# Patient Record
Sex: FEMALE | Race: WHITE | NOT HISPANIC OR LATINO | Employment: OTHER | URBAN - METROPOLITAN AREA
[De-identification: names, ages, dates, MRNs, and addresses within clinical notes are randomized per-mention and may not be internally consistent; named-entity substitution may affect disease eponyms.]

---

## 2019-03-05 ENCOUNTER — TRANSFERRED RECORDS (OUTPATIENT)
Dept: HEALTH INFORMATION MANAGEMENT | Facility: CLINIC | Age: 74
End: 2019-03-05

## 2019-03-05 LAB
CREAT SERPL-MCNC: 0.97 MG/DL (ref 0.6–0.93)
GFR SERPL CREATININE-BSD FRML MDRD: 57 ML/MIN/1.73M2
GLUCOSE SERPL-MCNC: 99 MG/DL (ref 65–99)
POTASSIUM SERPL-SCNC: 4.3 MMOL/L (ref 3.5–5.3)

## 2019-05-03 RX ORDER — ZOLEDRONIC ACID 5 MG/100ML
5 INJECTION, SOLUTION INTRAVENOUS ONCE
Status: CANCELLED
Start: 2019-05-20

## 2019-05-03 RX ORDER — HEPARIN SODIUM (PORCINE) LOCK FLUSH IV SOLN 100 UNIT/ML 100 UNIT/ML
5 SOLUTION INTRAVENOUS
Status: CANCELLED | OUTPATIENT
Start: 2019-05-20

## 2019-05-03 RX ORDER — HEPARIN SODIUM,PORCINE 10 UNIT/ML
5 VIAL (ML) INTRAVENOUS
Status: CANCELLED | OUTPATIENT
Start: 2019-05-20

## 2019-05-20 ENCOUNTER — INFUSION THERAPY VISIT (OUTPATIENT)
Dept: INFUSION THERAPY | Facility: CLINIC | Age: 74
End: 2019-05-20
Attending: SPECIALIST
Payer: COMMERCIAL

## 2019-05-20 ENCOUNTER — HOSPITAL ENCOUNTER (OUTPATIENT)
Facility: CLINIC | Age: 74
Setting detail: SPECIMEN
Discharge: HOME | End: 2019-05-20
Attending: SPECIALIST | Admitting: SPECIALIST
Payer: COMMERCIAL

## 2019-05-20 VITALS
TEMPERATURE: 98.7 F | RESPIRATION RATE: 18 BRPM | DIASTOLIC BLOOD PRESSURE: 83 MMHG | OXYGEN SATURATION: 100 % | HEART RATE: 57 BPM | SYSTOLIC BLOOD PRESSURE: 143 MMHG

## 2019-05-20 DIAGNOSIS — M81.0 OSTEOPOROSIS: Primary | ICD-10-CM

## 2019-05-20 LAB
CALCIUM SERPL-MCNC: 9 MG/DL (ref 8.5–10.1)
CREAT SERPL-MCNC: 0.93 MG/DL (ref 0.52–1.04)
GFR SERPL CREATININE-BSD FRML MDRD: 60 ML/MIN/{1.73_M2}

## 2019-05-20 PROCEDURE — 82310 ASSAY OF CALCIUM: CPT | Performed by: SPECIALIST

## 2019-05-20 PROCEDURE — 96374 THER/PROPH/DIAG INJ IV PUSH: CPT

## 2019-05-20 PROCEDURE — 82565 ASSAY OF CREATININE: CPT | Performed by: SPECIALIST

## 2019-05-20 PROCEDURE — 25000128 H RX IP 250 OP 636: Performed by: INTERNAL MEDICINE

## 2019-05-20 RX ORDER — ZOLEDRONIC ACID 5 MG/100ML
5 INJECTION, SOLUTION INTRAVENOUS ONCE
Status: COMPLETED | OUTPATIENT
Start: 2019-05-20 | End: 2019-05-20

## 2019-05-20 RX ORDER — ATORVASTATIN CALCIUM 10 MG/1
10 TABLET, FILM COATED ORAL DAILY
COMMUNITY

## 2019-05-20 RX ORDER — ZOLEDRONIC ACID 5 MG/100ML
5 INJECTION, SOLUTION INTRAVENOUS ONCE
Status: CANCELLED
Start: 2019-05-20

## 2019-05-20 RX ORDER — HEPARIN SODIUM,PORCINE 10 UNIT/ML
5 VIAL (ML) INTRAVENOUS
Status: CANCELLED | OUTPATIENT
Start: 2019-05-20

## 2019-05-20 RX ORDER — MULTIVITAMIN WITH IRON
1 TABLET ORAL DAILY
COMMUNITY

## 2019-05-20 RX ORDER — HEPARIN SODIUM (PORCINE) LOCK FLUSH IV SOLN 100 UNIT/ML 100 UNIT/ML
5 SOLUTION INTRAVENOUS
Status: CANCELLED | OUTPATIENT
Start: 2019-05-20

## 2019-05-20 RX ORDER — TAMOXIFEN CITRATE 10 MG/1
10 TABLET ORAL DAILY
COMMUNITY
End: 2023-11-30

## 2019-05-20 RX ADMIN — ZOLEDRONIC ACID 5 MG: 0.05 INJECTION, SOLUTION INTRAVENOUS at 15:25

## 2019-05-20 ASSESSMENT — PAIN SCALES - GENERAL: PAINLEVEL: NO PAIN (0)

## 2019-05-20 NOTE — PROGRESS NOTES
Infusion Nursing Note:  Immanuel Thomas presents today for Reclast.    Patient seen by provider today: No    Note: First time getting Reclast today. Oriented to infusion center. Reclast teaching, side effects, and schedule reviewed with patient.  Literature on Reclast given to patient. Patient has been on Prolia in the past and questioning why her physician switched her medication.  Patient instructed to call her ordering physician to clarify.  Patient tolerated well to infusion today.    Intravenous Access:  Peripheral IV placed.      Treatment Conditions:  Lab Results   Component Value Date     12/16/2010                   Lab Results   Component Value Date    POTASSIUM 4.3 03/05/2019           No results found for: MAG         Lab Results   Component Value Date    CR 0.93 05/20/2019                   Lab Results   Component Value Date    SHONDA 9.0 05/20/2019                No results found for: BILITOTAL        No results found for: ALBUMIN                 No results found for: ALT        No results found for: AST  Results reviewed, labs MET treatment parameters, ok to proceed with treatment.      Post Infusion Assessment:  Patient tolerated infusion without incident.  Blood return noted pre and post infusion.  Site patent and intact, free from redness, edema or discomfort.  No evidence of extravasations.  Access discontinued per protocol.    Discharge Plan:   Patient declined prescription refills.  Discharge instructions reviewed with: Patient.  Patient and/or family verbalized understanding of discharge instructions and all questions answered.  Copy of AVS reviewed with patient and/or family.  Patient will return in one year for next appointment.  Patient discharged in stable condition accompanied by: self.  Departure Mode: Ambulatory.    Melanie Dickinson RN                    '

## 2019-07-25 ENCOUNTER — TRANSFERRED RECORDS (OUTPATIENT)
Dept: HEALTH INFORMATION MANAGEMENT | Facility: CLINIC | Age: 74
End: 2019-07-25

## 2019-11-12 ENCOUNTER — TRANSFERRED RECORDS (OUTPATIENT)
Dept: HEALTH INFORMATION MANAGEMENT | Facility: CLINIC | Age: 74
End: 2019-11-12

## 2020-03-02 ENCOUNTER — PATIENT OUTREACH (OUTPATIENT)
Dept: ONCOLOGY | Facility: CLINIC | Age: 75
End: 2020-03-02

## 2020-03-02 NOTE — PROGRESS NOTES
Immanuel called clinic stating that she needs a refill of her Tamoxifen. Stated to Immanuel that Dr. Vicente is unable to refill her Tamoxifen because she has not transferred her care here. She was given phone number of new patient scheduling to schedule. Inquired if she still has enough Tamoxifen. She stated that she still has a month supply but wanted to get more medication refilled secondary to the Coronavirus. Stated that once her records are received her Tamoxifen can be refilled. Lashawn Rider RN,BSN,OCN

## 2020-03-11 ENCOUNTER — TRANSFERRED RECORDS (OUTPATIENT)
Dept: HEALTH INFORMATION MANAGEMENT | Facility: CLINIC | Age: 75
End: 2020-03-11

## 2020-03-23 ENCOUNTER — TELEPHONE (OUTPATIENT)
Dept: ONCOLOGY | Facility: CLINIC | Age: 75
End: 2020-03-23

## 2020-03-23 DIAGNOSIS — C50.919 BREAST CANCER (H): ICD-10-CM

## 2020-03-23 DIAGNOSIS — Z17.0 MALIGNANT NEOPLASM OF CENTRAL PORTION OF RIGHT BREAST IN FEMALE, ESTROGEN RECEPTOR POSITIVE (H): Primary | ICD-10-CM

## 2020-03-23 DIAGNOSIS — C50.111 MALIGNANT NEOPLASM OF CENTRAL PORTION OF RIGHT BREAST IN FEMALE, ESTROGEN RECEPTOR POSITIVE (H): Primary | ICD-10-CM

## 2020-03-23 RX ORDER — TAMOXIFEN CITRATE 20 MG/1
20 TABLET ORAL DAILY
Qty: 90 TABLET | Refills: 1 | Status: SHIPPED | OUTPATIENT
Start: 2020-03-23 | End: 2023-11-30

## 2020-03-23 NOTE — TELEPHONE ENCOUNTER
Patient called clinic requesting a refill for Tamoxifen. Patient states she has requested to have records transferred. Please contact patient.

## 2020-03-23 NOTE — TELEPHONE ENCOUNTER
Patient called to inquire if we had received all her records from [facility] and request a refill on her Tamoxifen.      It appears all MD notes have been received and are scanned in, but there are no pathology records.  Will follow-up with SIENNA Hardin.    Patient transferred to scheduling to schedule appointment with Dr. Vicente in July.    Routing Tamoxifen refill to Dr. Vicente to be signed and modified if needed.    INGRID PatelN, RN, OCN  Oncology Care Coordinator  [facility]

## 2020-03-24 NOTE — TELEPHONE ENCOUNTER
RECORDS STATUS - BREAST    RECORDS REQUESTED FROM: [facility]    DATE REQUESTED: 7/27/2020   NOTES DETAILS STATUS   OFFICE NOTE from referring provider     OFFICE NOTE from medical oncologist Complete [facility] Records in [facility]   OFFICE NOTE from surgeon Complete Bx slides 3/25/2025- See Notes Below     OFFICE NOTE from radiation oncologist     DISCHARGE SUMMARY from hospital N/A    DISCHARGE REPORT from the ER     OPERATIVE REPORT Complete Right Breast Lumpectomy March 2015   MEDICATION LIST Complete [facility]   CLINICAL TRIAL TREATMENTS TO DATE     LABS     PATHOLOGY REPORTS  (Tissue diagnosis, Stage, ER/NH percentage positive and intensity of staining, HER2 IHC, FISH, and all biopsies from breast and any distant metastasis)                 Complete  [facility] 3/25/2015  Pathology Report                   Case: [id number]   RIGHT BREAST, LUMPECTOMY:  1. Invasive ductal carcinoma, Stratham grade II, with the following  Features:    Pathology Report                                  Case: [id number]    GENONOMIC TESTING     TYPE:   (Next Generation Sequencing, including Foundation One testing, and Oncotype score)     IMAGING (NEED IMAGES & REPORT)     CT SCANS Complete-[facility] 7/29/2016 7/1/2015   MRI     Xray Breast Complete-[facility] 3/23/2015   MAMMO Complete- [facility] 3/11/2020   2/27/2019   2/26/2018  2/23/2016   Xray Chest Complete-[facility] 3/1/2018    ULTRASOUND Complete-[facility] 3/23/2015   NM Breast Complete-[facility] 3/23/2015   PET     BONE SCAN Complete-[facility]  3/8/2018, 2/23/2016 Xray Bone Density    BRAIN MRI       Action    Action Taken 3/24/2020 8:30am   I called pt Immanuel- the phone went to . Try calling pt back again.     8:37am   Immanuel called back and said Lashawn Rider (Dr. Vicente's nurse) received some records yesterday. Immanuel also signed a release for [facility] to release records to [employer] a few weeks ago. IMG is at [facility]. Ike Gambino thought Dr. Vicente has already reviewed her path slides from  [facility].    I called [facility] Ph: [phone] - they said records were sent back on March 13th to Dr. Vicente's office. I see that the records are already in Epic. Pt started going to [facility] back on April 2nd of 2015 through July 25th 2019. [facility] also sent lab work. I sent an ib-message to the [facility] clinical staff updating them on the [facility] records- it looks like all records have been accounted for in Epic already.     I called [facility] to have IMG pushed to PACS. [facility] Rm: [phone]. [facility] said they only have some breast imaging and chest imaging on file. [facility] doesn't know where the pt's bone imaging was done.     I called [facility]. (Push to PACS) [phone] #3- they only have image reports (mammo and bone density from 2005 and 2004).     9:33am   I called Immanuel back and she confirmed that most of her imaging is at [facility]. I called [facility] (ph: [phone])- [facility] is going to push IMG from 2006- 2020 and fax over IMG reports.

## 2020-07-24 NOTE — PROGRESS NOTES
[facility]    Hematology/Oncology Established Patient Follow-up Note      Today's Date: 07/27/20    Reason for Follow-up: Right breast invasive ductal carcinoma status post lumpectomy.    HISTORY OF PRESENT ILLNESS: Immanuel Thomas is a 74 year old female history of osteoporosis and fractures and hormone replacement therapy who presents with the following oncologic history:  1. 2/16/15: Annual screening mammogram showed a 0.8 cm focal asymmetry in the right retroareolar region of the breast. No suspicious findings on the left.  2. 2/26/15: After diagnostic mammogram and ultrasound with biopsy, pathology showed a grade 2 invasive ductal carcinoma with angiolymphatic invasion absent and associated DCIS present. Estrogen receptor was strongly positive at 94%, progesterone receptor strongly positive at 78% with HER-2/brayan FISH negative with ratio 1.05.  3. 3/23/15: Underwent right lumpectomy under the care of Dr. Anitha Tran. Pathology revealed a grade 2 invasive ductal carcinoma measuring 0.6 cm. [place] lymph node biopsy of a total of 2 lymph nodes were negative. All surgical margins were negative after reexcision.  4. 6/11/15: Completed adjuvant radiation therapy. Thereafter started tamoxifen.    INTERIM HISTORY:  Immanuel reports feeling well and denies any fevers, chills, night sweats, cough, dyspnea, bowel, or bladder dysfunction.      REVIEW OF SYSTEMS:   14 point ROS was reviewed and is negative other than as noted above in HPI.       HOME MEDICATIONS:  Current Outpatient Medications   Medication Sig Dispense Refill     Alendronate Sodium (FOSAMAX PO) Take  by mouth. ON HOLD 70 mg once a week       Aspirin (ASPIR-81 PO) Take  by mouth. One daily       atorvastatin (LIPITOR) 10 MG tablet Take 10 mg by mouth daily       CALCIUM PO Take  by mouth. Unsure of dosage two daily       GLUCOSAMINE-CHONDROITIN PO Take  by mouth. Unsure of dosage 2 daily       magnesium 250 MG tablet Take 1 tablet by  mouth daily       Multiple Vitamin (MULTIVITAMINS PO) Take  by mouth. One daily       Omega-3 Fatty Acids (FISH OIL PO) Take  by mouth. Two daily       tamoxifen (NOLVADEX) 10 MG tablet Take 10 mg by mouth daily       tamoxifen (NOLVADEX) 20 MG tablet Take 1 tablet (20 mg) by mouth daily 90 tablet 1     vitamin B-12 (CYANOCOBALAMIN) 100 MCG tablet Take 1,000 mcg by mouth daily       VITAMIN D PO Take  by mouth. One daily unsure of dosage           ALLERGIES:  No Known Allergies      PAST MEDICAL HISTORY:  Past Medical History:   Diagnosis Date     Osteoporosis          PAST SURGICAL HISTORY:  Past Surgical History:   Procedure Laterality Date     LUMPECTOMY BREAST Right 2015         SOCIAL HISTORY:  Social History     Socioeconomic History     Marital status: Single     Spouse name: Not on file     Number of children: Not on file     Years of education: Not on file     Highest education level: Not on file   Occupational History     Not on file   Social Needs     Financial resource strain: Not on file     Food insecurity     Worry: Not on file     Inability: Not on file     Transportation needs     Medical: Not on file     Non-medical: Not on file   Tobacco Use     Smoking status: Former Smoker     Packs/day: 1.00     Last attempt to quit: 1985     Years since quittin.9   Substance and Sexual Activity     Alcohol use: Not on file     Drug use: Not on file     Sexual activity: Not on file   Lifestyle     Physical activity     Days per week: Not on file     Minutes per session: Not on file     Stress: Not on file   Relationships     Social connections     Talks on phone: Not on file     Gets together: Not on file     Attends Restorationist service: Not on file     Active member of club or organization: Not on file     Attends meetings of clubs or organizations: Not on file     Relationship status: Not on file     Intimate partner violence     Fear of current or ex partner: Not on file     Emotionally abused:  Not on file     Physically abused: Not on file     Forced sexual activity: Not on file   Other Topics Concern     Parent/sibling w/ CABG, MI or angioplasty before 65F 55M? Not Asked   Social History Narrative     Not on file         FAMILY HISTORY:  Family History   Problem Relation Age of Onset     Osteoporosis Mother          PHYSICAL EXAM:  Vital signs:  /84   Pulse 67   Temp 97.4  F (36.3  C) (Oral)   Resp 18   Wt 77.5 kg (170 lb 12.8 oz)   SpO2 100%   BMI 31.24 kg/m     ECO  GENERAL/CONSTITUTIONAL: No acute distress.  EYES: No scleral icterus.  ENT/MOUTH: Neck supple.  LYMPH: No cervical, supraclavicular, axillary or epitrochlear adenopathy.   BREAST: No palpable discrete masses in either breast.  Nipples are everted bilaterally with no discharge. No new skin erythema or dimpling or ulceration.  RESPIRATORY: No cough or labored breathing.   CARDIOVASCULAR: No PMI displacement.  GASTROINTESTINAL: No hepatosplenomegaly, masses, or tenderness. No guarding.  No distention.  MUSCULOSKELETAL: Warm and well-perfused, no cyanosis, clubbing, or edema.  NEUROLOGIC: No focal motor deficits. Alert, oriented, answers questions appropriately.  INTEGUMENTARY: No rashes or jaundice.  GAIT: Steady, does not use assistive device      LABS:    Recent Labs   Lab Test 19  1430 19   POTASSIUM  --  4.3   GLC  --  99   CR 0.93 0.97*   SHONDA 9.0  --          PATHOLOGY:  Reviewed as per HPI.    IMAGING:  3/11/2020: CRL mammogram showed no suspicious findings.    ASSESSMENT/PLAN:  Immanuel Thomas is a 74 year old female with the following issues:  1. Stage IA, aS8p-L3-M7, grade 2 invasive ductal carcinoma of the right retroareolar central breast, status post lumpectomy, strongly ER+/CO+/HER2-negative  - I discussed with Immanuel that she has no clinical evidence for recurrent breast cancer by physical exam or last mammogram from 3/11/2020.  She already completed 5 years of tamoxifen as of 2020 and may now  discontinue tamoxifen.  Immanuel is comfortable with this.  - Next mammogram due in 3/2021.    2. Osteoporosis  - She does have history of fractures. She follows with Dr. Phan for this. She will continue on calcium, vitamin D. I again recommended weightbearing exercise.    Immanuel may graduate from oncology but I would be happy to see her again at any time should any oncologic needs arise.    Miracle Vicente MD  Hematology/Oncology  [facility]    I spent a total of 20 minutes with the patient, with greater than 50% of the time in counseling and coordination of care.

## 2020-07-27 ENCOUNTER — ONCOLOGY VISIT (OUTPATIENT)
Dept: ONCOLOGY | Facility: CLINIC | Age: 75
End: 2020-07-27
Attending: INTERNAL MEDICINE
Payer: COMMERCIAL

## 2020-07-27 ENCOUNTER — PRE VISIT (OUTPATIENT)
Dept: ONCOLOGY | Facility: CLINIC | Age: 75
End: 2020-07-27

## 2020-07-27 VITALS
BODY MASS INDEX: 31.24 KG/M2 | RESPIRATION RATE: 18 BRPM | SYSTOLIC BLOOD PRESSURE: 123 MMHG | HEART RATE: 67 BPM | WEIGHT: 170.8 LBS | DIASTOLIC BLOOD PRESSURE: 84 MMHG | TEMPERATURE: 97.4 F | OXYGEN SATURATION: 100 %

## 2020-07-27 DIAGNOSIS — C50.111 MALIGNANT NEOPLASM OF CENTRAL PORTION OF RIGHT BREAST IN FEMALE, ESTROGEN RECEPTOR POSITIVE (H): Primary | ICD-10-CM

## 2020-07-27 DIAGNOSIS — Z17.0 MALIGNANT NEOPLASM OF CENTRAL PORTION OF RIGHT BREAST IN FEMALE, ESTROGEN RECEPTOR POSITIVE (H): Primary | ICD-10-CM

## 2020-07-27 PROCEDURE — 99213 OFFICE O/P EST LOW 20 MIN: CPT | Performed by: INTERNAL MEDICINE

## 2020-07-27 PROCEDURE — G0463 HOSPITAL OUTPT CLINIC VISIT: HCPCS

## 2020-07-27 ASSESSMENT — PAIN SCALES - GENERAL: PAINLEVEL: NO PAIN (0)

## 2020-07-27 NOTE — PROGRESS NOTES
"Oncology Rooming Note    July 27, 2020 2:09 PM   Immanuel Thomas is a 74 year old female who presents for:    Chief Complaint   Patient presents with     Oncology Clinic Visit     Initial Vitals: /84   Pulse 67   Temp 97.4  F (36.3  C) (Oral)   Resp 18   Wt 77.5 kg (170 lb 12.8 oz)   SpO2 100%   BMI 31.24 kg/m   Estimated body mass index is 31.24 kg/m  as calculated from the following:    Height as of 8/11/11: 1.575 m (5' 2\").    Weight as of this encounter: 77.5 kg (170 lb 12.8 oz). Body surface area is 1.84 meters squared.  No Pain (0) Comment: Data Unavailable   No LMP recorded.  Allergies reviewed: Yes  Medications reviewed: Yes    Medications: Medication refills not needed today.  Pharmacy name entered into EPIC:    [id number] - [place], [place] - [address] [facility] [place] [place] - [address] AT PORTAL TO [facility] SITES    Clinical concerns: no       Shari J. Schoenberger, PRABHJOT            "

## 2020-07-27 NOTE — LETTER
7/27/2020         RE: Immanuel Thomas  [address] [place] [address]        Dear Colleague,    Thank you for referring your patient, Immanuel Thomas, to the [facility]. Please see a copy of my visit note below.    [facility]    Hematology/Oncology Established Patient Follow-up Note      Today's Date: 07/27/20    Reason for Follow-up: Right breast invasive ductal carcinoma status post lumpectomy.    HISTORY OF PRESENT ILLNESS: Immanuel Thomas is a 74 year old female history of osteoporosis and fractures and hormone replacement therapy who presents with the following oncologic history:  1. 2/16/15: Annual screening mammogram showed a 0.8 cm focal asymmetry in the right retroareolar region of the breast. No suspicious findings on the left.  2. 2/26/15: After diagnostic mammogram and ultrasound with biopsy, pathology showed a grade 2 invasive ductal carcinoma with angiolymphatic invasion absent and associated DCIS present. Estrogen receptor was strongly positive at 94%, progesterone receptor strongly positive at 78% with HER-2/brayan FISH negative with ratio 1.05.  3. 3/23/15: Underwent right lumpectomy under the care of Dr. Anitha Tran. Pathology revealed a grade 2 invasive ductal carcinoma measuring 0.6 cm. [place] lymph node biopsy of a total of 2 lymph nodes were negative. All surgical margins were negative after reexcision.  4. 6/11/15: Completed adjuvant radiation therapy. Thereafter started tamoxifen.    INTERIM HISTORY:  Immanuel reports feeling well and denies any fevers, chills, night sweats, cough, dyspnea, bowel, or bladder dysfunction.      REVIEW OF SYSTEMS:   14 point ROS was reviewed and is negative other than as noted above in HPI.       HOME MEDICATIONS:  Current Outpatient Medications   Medication Sig Dispense Refill     Alendronate Sodium (FOSAMAX PO) Take  by mouth. ON HOLD 70 mg once a week       Aspirin (ASPIR-81 PO) Take  by mouth. One daily        atorvastatin (LIPITOR) 10 MG tablet Take 10 mg by mouth daily       CALCIUM PO Take  by mouth. Unsure of dosage two daily       GLUCOSAMINE-CHONDROITIN PO Take  by mouth. Unsure of dosage 2 daily       magnesium 250 MG tablet Take 1 tablet by mouth daily       Multiple Vitamin (MULTIVITAMINS PO) Take  by mouth. One daily       Omega-3 Fatty Acids (FISH OIL PO) Take  by mouth. Two daily       tamoxifen (NOLVADEX) 10 MG tablet Take 10 mg by mouth daily       tamoxifen (NOLVADEX) 20 MG tablet Take 1 tablet (20 mg) by mouth daily 90 tablet 1     vitamin B-12 (CYANOCOBALAMIN) 100 MCG tablet Take 1,000 mcg by mouth daily       VITAMIN D PO Take  by mouth. One daily unsure of dosage           ALLERGIES:  No Known Allergies      PAST MEDICAL HISTORY:  Past Medical History:   Diagnosis Date     Osteoporosis          PAST SURGICAL HISTORY:  Past Surgical History:   Procedure Laterality Date     LUMPECTOMY BREAST Right 2015         SOCIAL HISTORY:  Social History     Socioeconomic History     Marital status: Single     Spouse name: Not on file     Number of children: Not on file     Years of education: Not on file     Highest education level: Not on file   Occupational History     Not on file   Social Needs     Financial resource strain: Not on file     Food insecurity     Worry: Not on file     Inability: Not on file     Transportation needs     Medical: Not on file     Non-medical: Not on file   Tobacco Use     Smoking status: Former Smoker     Packs/day: 1.00     Last attempt to quit: 1985     Years since quittin.9   Substance and Sexual Activity     Alcohol use: Not on file     Drug use: Not on file     Sexual activity: Not on file   Lifestyle     Physical activity     Days per week: Not on file     Minutes per session: Not on file     Stress: Not on file   Relationships     Social connections     Talks on phone: Not on file     Gets together: Not on file     Attends Worship service: Not on file      Active member of club or organization: Not on file     Attends meetings of clubs or organizations: Not on file     Relationship status: Not on file     Intimate partner violence     Fear of current or ex partner: Not on file     Emotionally abused: Not on file     Physically abused: Not on file     Forced sexual activity: Not on file   Other Topics Concern     Parent/sibling w/ CABG, MI or angioplasty before 65F 55M? Not Asked   Social History Narrative     Not on file         FAMILY HISTORY:  Family History   Problem Relation Age of Onset     Osteoporosis Mother          PHYSICAL EXAM:  Vital signs:  /84   Pulse 67   Temp 97.4  F (36.3  C) (Oral)   Resp 18   Wt 77.5 kg (170 lb 12.8 oz)   SpO2 100%   BMI 31.24 kg/m     ECO  GENERAL/CONSTITUTIONAL: No acute distress.  EYES: No scleral icterus.  ENT/MOUTH: Neck supple.  LYMPH: No cervical, supraclavicular, axillary or epitrochlear adenopathy.   BREAST: No palpable discrete masses in either breast.  Nipples are everted bilaterally with no discharge. No new skin erythema or dimpling or ulceration.  RESPIRATORY: No cough or labored breathing.   CARDIOVASCULAR: No PMI displacement.  GASTROINTESTINAL: No hepatosplenomegaly, masses, or tenderness. No guarding.  No distention.  MUSCULOSKELETAL: Warm and well-perfused, no cyanosis, clubbing, or edema.  NEUROLOGIC: No focal motor deficits. Alert, oriented, answers questions appropriately.  INTEGUMENTARY: No rashes or jaundice.  GAIT: Steady, does not use assistive device      LABS:    Recent Labs   Lab Test 19  1430 19   POTASSIUM  --  4.3   GLC  --  99   CR 0.93 0.97*   SHONDA 9.0  --          PATHOLOGY:  Reviewed as per HPI.    IMAGING:  3/11/2020: CRL mammogram showed no suspicious findings.    ASSESSMENT/PLAN:  Immanuel Thomas is a 74 year old female with the following issues:  1. Stage IA, cV3k-G6-N9, grade 2 invasive ductal carcinoma of the right retroareolar central breast, status post  "lumpectomy, strongly ER+/OR+/HER2-negative  - I discussed with Immanuel that she has no clinical evidence for recurrent breast cancer by physical exam or last mammogram from 3/11/2020.  She already completed 5 years of tamoxifen as of 6/2020 and may now discontinue tamoxifen.  Immanuel is comfortable with this.  - Next mammogram due in 3/2021.    2. Osteoporosis  - She does have history of fractures. She follows with Dr. Phan for this. She will continue on calcium, vitamin D. I again recommended weightbearing exercise.    Immanuel may graduate from oncology but I would be happy to see her again at any time should any oncologic needs arise.    Miracle Vicente MD  Hematology/Oncology  [facility]    I spent a total of 20 minutes with the patient, with greater than 50% of the time in counseling and coordination of care.    Oncology Rooming Note    July 27, 2020 2:09 PM   Immanuel Thomas is a 74 year old female who presents for:    Chief Complaint   Patient presents with     Oncology Clinic Visit     Initial Vitals: /84   Pulse 67   Temp 97.4  F (36.3  C) (Oral)   Resp 18   Wt 77.5 kg (170 lb 12.8 oz)   SpO2 100%   BMI 31.24 kg/m   Estimated body mass index is 31.24 kg/m  as calculated from the following:    Height as of 8/11/11: 1.575 m (5' 2\").    Weight as of this encounter: 77.5 kg (170 lb 12.8 oz). Body surface area is 1.84 meters squared.  No Pain (0) Comment: Data Unavailable   No LMP recorded.  Allergies reviewed: Yes  Medications reviewed: Yes    Medications: Medication refills not needed today.  Pharmacy name entered into [facility]:    [facility] - [place], [place] - [address] [facility] [place] [place] - [address] AT PORTAL TO [facility] SITES    Clinical concerns: no       Shari J. Schoenberger, [facility]              Again, thank you for allowing me to participate in the care of your patient.  "       Sincerely,        Miracle Vicente MD

## 2020-07-28 ENCOUNTER — TELEPHONE (OUTPATIENT)
Dept: ONCOLOGY | Facility: CLINIC | Age: 75
End: 2020-07-28

## 2020-07-28 NOTE — PROGRESS NOTES
Received positive distress screen regarding patient's concern for current weight.  Called and spoke with patient.  Patient states she knows what she needs to do but is just not following it.  Patient has been lifelong Weight Watcher.  Patient appreciative of call and declines any further follow up.  Encouraged patient if does have RD to call the clinic and will reach out again.  Patient verbalized understanding of plan.     Rosanne Winkler, RD, LD  Clinical Dietitian  [facility]  [phone] (direct)

## 2020-08-12 ENCOUNTER — TRANSFERRED RECORDS (OUTPATIENT)
Dept: ONCOLOGY | Facility: CLINIC | Age: 75
End: 2020-08-12

## 2021-03-12 ENCOUNTER — TRANSFERRED RECORDS (OUTPATIENT)
Dept: HEALTH INFORMATION MANAGEMENT | Facility: CLINIC | Age: 76
End: 2021-03-12

## 2022-09-16 ENCOUNTER — TRANSFERRED RECORDS (OUTPATIENT)
Dept: HEALTH INFORMATION MANAGEMENT | Facility: CLINIC | Age: 77
End: 2022-09-16

## 2022-09-16 LAB
CREATININE (EXTERNAL): 1.1 MG/DL (ref 0.57–1)
GFR ESTIMATED (EXTERNAL): 51 ML/MIN/1.7
GLUCOSE (EXTERNAL): 102 MG/DL (ref 65–99)
POTASSIUM (EXTERNAL): 4.8 MMOL/L (ref 3.5–5.2)

## 2022-10-06 ENCOUNTER — TRANSCRIBE ORDERS (OUTPATIENT)
Dept: PHARMACY | Facility: CLINIC | Age: 77
End: 2022-10-06

## 2022-10-06 RX ORDER — EPINEPHRINE 1 MG/ML
0.3 INJECTION, SOLUTION INTRAMUSCULAR; SUBCUTANEOUS EVERY 5 MIN PRN
Status: CANCELLED | OUTPATIENT
Start: 2022-10-14

## 2022-10-06 RX ORDER — ALBUTEROL SULFATE 0.83 MG/ML
2.5 SOLUTION RESPIRATORY (INHALATION)
Status: CANCELLED | OUTPATIENT
Start: 2022-10-14

## 2022-10-06 RX ORDER — ALBUTEROL SULFATE 90 UG/1
1-2 AEROSOL, METERED RESPIRATORY (INHALATION)
Status: CANCELLED
Start: 2022-10-14

## 2022-10-06 RX ORDER — DIPHENHYDRAMINE HYDROCHLORIDE 50 MG/ML
50 INJECTION INTRAMUSCULAR; INTRAVENOUS
Status: CANCELLED
Start: 2022-10-14

## 2022-10-06 RX ORDER — METHYLPREDNISOLONE SODIUM SUCCINATE 125 MG/2ML
125 INJECTION, POWDER, LYOPHILIZED, FOR SOLUTION INTRAMUSCULAR; INTRAVENOUS
Status: CANCELLED
Start: 2022-10-14

## 2022-10-06 RX ORDER — MEPERIDINE HYDROCHLORIDE 25 MG/ML
25 INJECTION INTRAMUSCULAR; INTRAVENOUS; SUBCUTANEOUS EVERY 30 MIN PRN
Status: CANCELLED | OUTPATIENT
Start: 2022-10-14

## 2022-10-18 ENCOUNTER — TELEPHONE (OUTPATIENT)
Dept: INFUSION THERAPY | Facility: CLINIC | Age: 77
End: 2022-10-18

## 2022-10-18 NOTE — TELEPHONE ENCOUNTER
Whit from Endocrinology Clinic [phone] called and she requested information regarding denial from insurance for Evenity for patient. Routed message to [employer] to contact clinic with any information.

## 2022-10-25 ENCOUNTER — TELEPHONE (OUTPATIENT)
Dept: ONCOLOGY | Facility: CLINIC | Age: 77
End: 2022-10-25

## 2022-10-25 NOTE — TELEPHONE ENCOUNTER
Whit from Dr Weber's office called regarding denial for Evenity.  This  sent staff message to Rk Pulido with [employer] for possible appeal.

## 2022-11-04 ENCOUNTER — LAB (OUTPATIENT)
Dept: INFUSION THERAPY | Facility: CLINIC | Age: 77
End: 2022-11-04
Attending: INTERNAL MEDICINE
Payer: COMMERCIAL

## 2022-11-04 VITALS
DIASTOLIC BLOOD PRESSURE: 77 MMHG | SYSTOLIC BLOOD PRESSURE: 134 MMHG | HEART RATE: 65 BPM | RESPIRATION RATE: 16 BRPM | OXYGEN SATURATION: 94 %

## 2022-11-04 DIAGNOSIS — M81.0 OSTEOPOROSIS: Primary | ICD-10-CM

## 2022-11-04 PROCEDURE — 250N000011 HC RX IP 250 OP 636: Performed by: INTERNAL MEDICINE

## 2022-11-04 PROCEDURE — 96372 THER/PROPH/DIAG INJ SC/IM: CPT | Performed by: INTERNAL MEDICINE

## 2022-11-04 RX ORDER — METHYLPREDNISOLONE SODIUM SUCCINATE 125 MG/2ML
125 INJECTION, POWDER, LYOPHILIZED, FOR SOLUTION INTRAMUSCULAR; INTRAVENOUS
Status: CANCELLED
Start: 2022-12-02

## 2022-11-04 RX ORDER — ALBUTEROL SULFATE 0.83 MG/ML
2.5 SOLUTION RESPIRATORY (INHALATION)
Status: CANCELLED | OUTPATIENT
Start: 2022-12-02

## 2022-11-04 RX ORDER — DIPHENHYDRAMINE HYDROCHLORIDE 50 MG/ML
50 INJECTION INTRAMUSCULAR; INTRAVENOUS
Status: CANCELLED
Start: 2022-12-02

## 2022-11-04 RX ORDER — ALBUTEROL SULFATE 90 UG/1
1-2 AEROSOL, METERED RESPIRATORY (INHALATION)
Status: CANCELLED
Start: 2022-12-02

## 2022-11-04 RX ORDER — EPINEPHRINE 1 MG/ML
0.3 INJECTION, SOLUTION INTRAMUSCULAR; SUBCUTANEOUS EVERY 5 MIN PRN
Status: CANCELLED | OUTPATIENT
Start: 2022-12-02

## 2022-11-04 RX ORDER — MEPERIDINE HYDROCHLORIDE 25 MG/ML
25 INJECTION INTRAMUSCULAR; INTRAVENOUS; SUBCUTANEOUS EVERY 30 MIN PRN
Status: CANCELLED | OUTPATIENT
Start: 2022-12-02

## 2022-11-04 RX ADMIN — ROMOSOZUMAB-AQQG 210 MG: 105 INJECTION, SOLUTION SUBCUTANEOUS at 13:09

## 2022-11-04 ASSESSMENT — PAIN SCALES - GENERAL: PAINLEVEL: NO PAIN (0)

## 2022-11-04 NOTE — PROGRESS NOTES
Infusion Nursing Note:  Immanuel Thomas presents today for Evenity.    Patient seen by provider today: No   present during visit today: Not Applicable.    Note: Reviewed medication and side effects with patient. Also gave her the patient copy insert from package.    Intravenous Access:  No Intravenous access/labs at this visit.    Treatment Conditions:  Not Applicable.    Post Infusion Assessment:  Patient tolerated injection without incident.  Site patent and intact, free from redness, edema or discomfort.  No evidence of extravasations.     Discharge Plan:   Patient discharged in stable condition accompanied by: self.  Departure Mode: Ambulatory.      Claudine Cadena RN

## 2022-12-16 ENCOUNTER — INFUSION THERAPY VISIT (OUTPATIENT)
Dept: INFUSION THERAPY | Facility: CLINIC | Age: 77
End: 2022-12-16
Attending: INTERNAL MEDICINE
Payer: COMMERCIAL

## 2022-12-16 VITALS
SYSTOLIC BLOOD PRESSURE: 112 MMHG | DIASTOLIC BLOOD PRESSURE: 70 MMHG | TEMPERATURE: 97.3 F | RESPIRATION RATE: 16 BRPM | HEART RATE: 76 BPM

## 2022-12-16 DIAGNOSIS — M81.0 OSTEOPOROSIS: Primary | ICD-10-CM

## 2022-12-16 PROCEDURE — 250N000011 HC RX IP 250 OP 636: Performed by: INTERNAL MEDICINE

## 2022-12-16 PROCEDURE — 96372 THER/PROPH/DIAG INJ SC/IM: CPT | Performed by: INTERNAL MEDICINE

## 2022-12-16 RX ORDER — EPINEPHRINE 1 MG/ML
0.3 INJECTION, SOLUTION INTRAMUSCULAR; SUBCUTANEOUS EVERY 5 MIN PRN
Status: CANCELLED | OUTPATIENT
Start: 2022-12-30

## 2022-12-16 RX ORDER — FERROUS GLUCONATE 324(38)MG
324 TABLET ORAL
COMMUNITY

## 2022-12-16 RX ORDER — CHLORAL HYDRATE 500 MG
2 CAPSULE ORAL DAILY
COMMUNITY

## 2022-12-16 RX ORDER — VIT C/B6/B5/MAGNESIUM/HERB 173 50-5-6-5MG
CAPSULE ORAL
COMMUNITY

## 2022-12-16 RX ORDER — ALBUTEROL SULFATE 0.83 MG/ML
2.5 SOLUTION RESPIRATORY (INHALATION)
Status: CANCELLED | OUTPATIENT
Start: 2022-12-30

## 2022-12-16 RX ORDER — ALBUTEROL SULFATE 90 UG/1
1-2 AEROSOL, METERED RESPIRATORY (INHALATION)
Status: CANCELLED
Start: 2022-12-30

## 2022-12-16 RX ORDER — METHYLPREDNISOLONE SODIUM SUCCINATE 125 MG/2ML
125 INJECTION, POWDER, LYOPHILIZED, FOR SOLUTION INTRAMUSCULAR; INTRAVENOUS
Status: CANCELLED
Start: 2022-12-30

## 2022-12-16 RX ORDER — MEPERIDINE HYDROCHLORIDE 25 MG/ML
25 INJECTION INTRAMUSCULAR; INTRAVENOUS; SUBCUTANEOUS EVERY 30 MIN PRN
Status: CANCELLED | OUTPATIENT
Start: 2022-12-30

## 2022-12-16 RX ORDER — DIPHENHYDRAMINE HYDROCHLORIDE 50 MG/ML
50 INJECTION INTRAMUSCULAR; INTRAVENOUS
Status: CANCELLED
Start: 2022-12-30

## 2022-12-16 RX ADMIN — ROMOSOZUMAB-AQQG 210 MG: 105 INJECTION, SOLUTION SUBCUTANEOUS at 14:13

## 2022-12-16 ASSESSMENT — PAIN SCALES - GENERAL: PAINLEVEL: NO PAIN (0)

## 2022-12-16 NOTE — PROGRESS NOTES
Infusion Nursing Note:  Immanuel Thomas presents today for evenity.    Patient seen by provider today: No   present during visit today: Not Applicable.    Note: N/A.    Intravenous Access:  No Intravenous access/labs at this visit.    Treatment Conditions:  Lab Results   Component Value Date     12/16/2010    POTASSIUM 4.3 03/05/2019    CR 0.93 05/20/2019    SHONDA 9.0 05/20/2019     Results reviewed, labs MET treatment parameters, ok to proceed with treatment.    Post Infusion Assessment:  Patient tolerated injection without incident.     Discharge Plan:   Patient declined prescription refills.  Discharge instructions reviewed with: Patient.  Patient and/or family verbalized understanding of discharge instructions and all questions answered.  Copy of AVS reviewed with patient and/or family.  Patient will return 1/18/23 for next appointment.  Patient discharged in stable condition accompanied by: self.  Departure Mode: Ambulatory.      Almaz Cueto RN

## 2023-01-18 ENCOUNTER — ALLIED HEALTH/NURSE VISIT (OUTPATIENT)
Dept: INFUSION THERAPY | Facility: CLINIC | Age: 78
End: 2023-01-18
Attending: INTERNAL MEDICINE
Payer: COMMERCIAL

## 2023-01-18 VITALS
DIASTOLIC BLOOD PRESSURE: 79 MMHG | SYSTOLIC BLOOD PRESSURE: 131 MMHG | TEMPERATURE: 97.4 F | RESPIRATION RATE: 20 BRPM | HEART RATE: 76 BPM

## 2023-01-18 DIAGNOSIS — M81.0 OSTEOPOROSIS: Primary | ICD-10-CM

## 2023-01-18 PROCEDURE — 250N000011 HC RX IP 250 OP 636: Performed by: INTERNAL MEDICINE

## 2023-01-18 PROCEDURE — 96372 THER/PROPH/DIAG INJ SC/IM: CPT | Performed by: INTERNAL MEDICINE

## 2023-01-18 RX ORDER — METHYLPREDNISOLONE SODIUM SUCCINATE 125 MG/2ML
125 INJECTION, POWDER, LYOPHILIZED, FOR SOLUTION INTRAMUSCULAR; INTRAVENOUS
Status: CANCELLED
Start: 2023-02-10

## 2023-01-18 RX ORDER — ALBUTEROL SULFATE 0.83 MG/ML
2.5 SOLUTION RESPIRATORY (INHALATION)
Status: CANCELLED | OUTPATIENT
Start: 2023-02-10

## 2023-01-18 RX ORDER — EPINEPHRINE 1 MG/ML
0.3 INJECTION, SOLUTION INTRAMUSCULAR; SUBCUTANEOUS EVERY 5 MIN PRN
Status: CANCELLED | OUTPATIENT
Start: 2023-02-10

## 2023-01-18 RX ORDER — DIPHENHYDRAMINE HYDROCHLORIDE 50 MG/ML
50 INJECTION INTRAMUSCULAR; INTRAVENOUS
Status: CANCELLED
Start: 2023-02-10

## 2023-01-18 RX ORDER — MEPERIDINE HYDROCHLORIDE 25 MG/ML
25 INJECTION INTRAMUSCULAR; INTRAVENOUS; SUBCUTANEOUS EVERY 30 MIN PRN
Status: CANCELLED | OUTPATIENT
Start: 2023-02-10

## 2023-01-18 RX ORDER — ALBUTEROL SULFATE 90 UG/1
1-2 AEROSOL, METERED RESPIRATORY (INHALATION)
Status: CANCELLED
Start: 2023-02-10

## 2023-01-18 RX ADMIN — ROMOSOZUMAB-AQQG 210 MG: 105 INJECTION, SOLUTION SUBCUTANEOUS at 14:27

## 2023-01-18 ASSESSMENT — PAIN SCALES - GENERAL: PAINLEVEL: NO PAIN (0)

## 2023-01-18 NOTE — PROGRESS NOTES
Infusion Nursing Note:  Immanuel Thomas presents today for evenity.    Patient seen by provider today: No   present during visit today: Not Applicable.    Note: N/A.    Intravenous Access:  No Intravenous access/labs at this visit.    Treatment Conditions:  Lab Results   Component Value Date     12/16/2010    POTASSIUM 4.3 03/05/2019    CR 0.93 05/20/2019    SHONDA 9.0 05/20/2019     Results reviewed, labs MET treatment parameters, ok to proceed with treatment.    Post Infusion Assessment:  Patient tolerated injection without incident.     Discharge Plan:   Patient and/or family verbalized understanding of discharge instructions and all questions answered.  AVS to patient via [employer].  Patient will return 2/15 for next appointment.   Patient discharged in stable condition accompanied by: self.  Departure Mode: Ambulatory.      Nenita Powers RN

## 2023-02-09 ENCOUNTER — HOSPITAL ENCOUNTER (EMERGENCY)
Facility: CLINIC | Age: 78
Discharge: HOME | End: 2023-02-09
Attending: EMERGENCY MEDICINE | Admitting: EMERGENCY MEDICINE
Payer: COMMERCIAL

## 2023-02-09 ENCOUNTER — APPOINTMENT (OUTPATIENT)
Dept: CT IMAGING | Facility: CLINIC | Age: 78
End: 2023-02-09
Attending: EMERGENCY MEDICINE
Payer: COMMERCIAL

## 2023-02-09 VITALS
HEART RATE: 87 BPM | DIASTOLIC BLOOD PRESSURE: 82 MMHG | TEMPERATURE: 97.5 F | RESPIRATION RATE: 16 BRPM | SYSTOLIC BLOOD PRESSURE: 129 MMHG | OXYGEN SATURATION: 100 %

## 2023-02-09 DIAGNOSIS — M25.521 PAIN IN JOINT, UPPER ARM, RIGHT: ICD-10-CM

## 2023-02-09 DIAGNOSIS — R42 DIZZINESS: ICD-10-CM

## 2023-02-09 DIAGNOSIS — R93.0 ABNORMAL COMPUTED TOMOGRAPHY ANGIOGRAPHY OF HEAD: ICD-10-CM

## 2023-02-09 LAB
ANION GAP SERPL CALCULATED.3IONS-SCNC: 9 MMOL/L (ref 7–15)
BASOPHILS # BLD AUTO: 0 10E3/UL (ref 0–0.2)
BASOPHILS NFR BLD AUTO: 1 %
BUN SERPL-MCNC: 22 MG/DL (ref 8–23)
CALCIUM SERPL-MCNC: 9.6 MG/DL (ref 8.8–10.2)
CHLORIDE SERPL-SCNC: 107 MMOL/L (ref 98–107)
CREAT SERPL-MCNC: 0.91 MG/DL (ref 0.51–0.95)
DEPRECATED HCO3 PLAS-SCNC: 23 MMOL/L (ref 22–29)
EOSINOPHIL # BLD AUTO: 0.2 10E3/UL (ref 0–0.7)
EOSINOPHIL NFR BLD AUTO: 3 %
ERYTHROCYTE [DISTWIDTH] IN BLOOD BY AUTOMATED COUNT: 13.6 % (ref 10–15)
GFR SERPL CREATININE-BSD FRML MDRD: 64 ML/MIN/1.73M2
GLUCOSE SERPL-MCNC: 110 MG/DL (ref 70–99)
HCT VFR BLD AUTO: 42.6 % (ref 35–47)
HGB BLD-MCNC: 13.6 G/DL (ref 11.7–15.7)
HOLD SPECIMEN: NORMAL
IMM GRANULOCYTES # BLD: 0 10E3/UL
IMM GRANULOCYTES NFR BLD: 0 %
LYMPHOCYTES # BLD AUTO: 1.2 10E3/UL (ref 0.8–5.3)
LYMPHOCYTES NFR BLD AUTO: 22 %
MCH RBC QN AUTO: 33.1 PG (ref 26.5–33)
MCHC RBC AUTO-ENTMCNC: 31.9 G/DL (ref 31.5–36.5)
MCV RBC AUTO: 104 FL (ref 78–100)
MONOCYTES # BLD AUTO: 0.7 10E3/UL (ref 0–1.3)
MONOCYTES NFR BLD AUTO: 13 %
NEUTROPHILS # BLD AUTO: 3.3 10E3/UL (ref 1.6–8.3)
NEUTROPHILS NFR BLD AUTO: 61 %
NRBC # BLD AUTO: 0 10E3/UL
NRBC BLD AUTO-RTO: 0 /100
PLATELET # BLD AUTO: 234 10E3/UL (ref 150–450)
POTASSIUM SERPL-SCNC: 4 MMOL/L (ref 3.4–5.3)
RBC # BLD AUTO: 4.11 10E6/UL (ref 3.8–5.2)
SODIUM SERPL-SCNC: 139 MMOL/L (ref 136–145)
TROPONIN T SERPL HS-MCNC: 13 NG/L
TROPONIN T SERPL HS-MCNC: 16 NG/L
WBC # BLD AUTO: 5.3 10E3/UL (ref 4–11)

## 2023-02-09 PROCEDURE — 99285 EMERGENCY DEPT VISIT HI MDM: CPT | Mod: 25

## 2023-02-09 PROCEDURE — 36415 COLL VENOUS BLD VENIPUNCTURE: CPT | Performed by: EMERGENCY MEDICINE

## 2023-02-09 PROCEDURE — 250N000011 HC RX IP 250 OP 636: Performed by: EMERGENCY MEDICINE

## 2023-02-09 PROCEDURE — 70450 CT HEAD/BRAIN W/O DYE: CPT

## 2023-02-09 PROCEDURE — 250N000009 HC RX 250: Performed by: EMERGENCY MEDICINE

## 2023-02-09 PROCEDURE — 70498 CT ANGIOGRAPHY NECK: CPT

## 2023-02-09 PROCEDURE — 84484 ASSAY OF TROPONIN QUANT: CPT | Performed by: EMERGENCY MEDICINE

## 2023-02-09 PROCEDURE — 85025 COMPLETE CBC W/AUTO DIFF WBC: CPT | Performed by: EMERGENCY MEDICINE

## 2023-02-09 PROCEDURE — 84484 ASSAY OF TROPONIN QUANT: CPT | Mod: 91 | Performed by: EMERGENCY MEDICINE

## 2023-02-09 PROCEDURE — 80048 BASIC METABOLIC PNL TOTAL CA: CPT | Performed by: EMERGENCY MEDICINE

## 2023-02-09 RX ORDER — IOPAMIDOL 755 MG/ML
500 INJECTION, SOLUTION INTRAVASCULAR ONCE
Status: COMPLETED | OUTPATIENT
Start: 2023-02-09 | End: 2023-02-09

## 2023-02-09 RX ADMIN — SODIUM CHLORIDE 80 ML: 9 INJECTION, SOLUTION INTRAVENOUS at 18:36

## 2023-02-09 RX ADMIN — IOPAMIDOL 75 ML: 755 INJECTION, SOLUTION INTRAVENOUS at 18:36

## 2023-02-09 ASSESSMENT — ACTIVITIES OF DAILY LIVING (ADL)
ADLS_ACUITY_SCORE: 33
ADLS_ACUITY_SCORE: 35

## 2023-02-09 NOTE — ED TRIAGE NOTES
"Pt reports blurred vision, dizziness, poor balance \"for a few years.\" Pt states more recently developed pain in right arm radiating into right chest area since Sunday morning. Here, pt endorses feeling lightheaded. Pt called triage line who were concerned pt is having TIA. ABCs intact.        "

## 2023-02-10 LAB
ATRIAL RATE - MUSE: 71 BPM
DIASTOLIC BLOOD PRESSURE - MUSE: NORMAL MMHG
INTERPRETATION ECG - MUSE: NORMAL
P AXIS - MUSE: 35 DEGREES
PR INTERVAL - MUSE: 176 MS
QRS DURATION - MUSE: 84 MS
QT - MUSE: 400 MS
QTC - MUSE: 434 MS
R AXIS - MUSE: 1 DEGREES
SYSTOLIC BLOOD PRESSURE - MUSE: NORMAL MMHG
T AXIS - MUSE: 64 DEGREES
VENTRICULAR RATE- MUSE: 71 BPM

## 2023-02-10 NOTE — DISCHARGE INSTRUCTIONS
Discharge Instructions  Dizziness (Lightheaded)  Today you were seen for dizziness.  Dizziness can be caused by many things and it can be very difficult to determine the cause of dizziness.  At this time, your provider has found no signs that your dizziness is due to a serious or life-threatening condition. However, sometimes there is a serious problem that does not show up right away, and it is important for you to follow up with your regular provider as instructed.  Generally, every Emergency Department visit should have a follow-up clinic visit with either a primary or a specialty clinic/provider. Please follow-up as instructed by your emergency provider today.      Return to the Emergency Department if:    You pass out (fainting or falling out), especially during exercise.    You develop chest pain, chest pressure or difficulty breathing.  Your feel an irregular heartbeat.  You have excessive vaginal bleeding, or blood in your stool or vomit (throw up).  You have a high fever.  Your symptoms get worse or more frequent.    If when you begin to feel dizzy or lightheaded, it is important to sit down or lay down immediately to prevent injury from falling.  If you were given a prescription for medicine here today, be sure to read all of the information (including the package insert) that comes with your prescription.  This will include important information about the medicine, its side effects, and any warnings that you need to know about.  The pharmacist who fills the prescription can provide more information and answer questions you may have about the medicine.  If you have questions or concerns that the pharmacist cannot address, please call or return to the Emergency Department.   Remember that you can always come back to the Emergency Department if you are not able to see your regular provider in the amount of time listed above, if you get any new symptoms, or if there is anything that worries you.    Discharge  Instructions  Right Arm Pain    You have been seen today for chest pain or similar discomfort.  At this time, your doctor has found no signs that your chest pain is due to a serious or life-threatening condition, (or you have declined more testing and/or admission to the hospital). However, sometimes there is a serious problem that does not show up right away. Your evaluation today may not be complete and you may need further testing and evaluation. You need to follow-up with your regular doctor within 3 days.    Return to the Emergency Department if:    Your chest pain changes, gets worse, starts to happen more often, or comes with less activity.  You are short of breath.  You get very weak or tired.  You pass out or faint.  You have any new symptoms, like fever, cough, numb legs, or you cough up blood  You have anything else that worries you.    Until you follow-up with your regular doctor please do the following:    If you have questions, contact your regular doctor.    If your doctor today has told you to follow-up with your regular doctor, it is very important that you make an appointment with your clinic and go to the appointment.  If you do not follow-up with your primary doctor, it may result in missing an important development which could result in permanent injury or disability and/or lasting pain.  If there is any problem keeping your appointment, call your doctor or return to the Emergency Department.      Remember that you can always come back to the Emergency Department if you are not able to see your regular doctor in the amount of time listed above, if you get any new symptoms, or if there is anything that worries you.

## 2023-02-10 NOTE — ED PROVIDER NOTES
History     Chief Complaint:  Dizziness       HPI   Immanuel Thomas is a 77 year old female with who presents for evaluation of 2 concerns:    1.  Right arm pain: Over the last several weeks, the patient has had several episodes of right arm pain extending from the bicep to the right shoulder.  Symptoms have been nonexertional and she has not had associated chest pain, shortness of breath, nausea, or vomiting.  Symptoms are currently not present and she denies upper extremity rash or edema or other concerns.    2.  Dizziness: The patient has had several lightheaded dizziness episodes over similar timeframe, and was concerned regarding possible stroke or other pathology.  Yesterday, while shopping, the patient had a more significant lightheaded episode where she nearly fainted.  She did not have speech or vision disruption, weakness, numbness, or other acute focal concerns.  However, several days ago, the patient did have some abnormal vision in her right eye, which resolved, described as a gray discoloration.  Patient notes she has spoken with her physician about the above symptoms but would like to further work-up today.      Review of External Notes: Reviewed nurse triage visit from today regarding the above.    ROS:  Review of Systems    Allergies:  No Known Allergies     Medications:    Atorvastatin  Tamofixen  Turmeric  Zolpidem    Past Medical History:    Breast cancer  Osteoporosis  Hypercholesterolemia  Vaginal pessary present    Past Surgical History:    Lumpectomy breast     Family History:    Osteoporosis    Social History:  Reports that she quit smoking about 37 years ago. She smoked an average of 1 pack per day. She has never used smokeless tobacco.    Physical Exam     Patient Vitals for the past 24 hrs:   BP Temp Temp src Pulse Resp SpO2   02/09/23 1415 129/82 97.5  F (36.4  C) Temporal 87 16 100 %      Physical Exam  Constitutional: Alert, attentive  HENT:    Nose: Nose normal.    Mouth/Throat:  Oropharynx is clear, mucous membranes are moist  Eyes: EOM are normal. Pupils are equal, round, and reactive to light.   CV: Regular rate and rhythm, no murmurs, rubs or gallops.  Chest: Effort normal and breath sounds normal.   GI: No distension. There is no tenderness  MSK: Normal range of motion.   Neurological:   A/Ox3;   Cranial nerves 2-12 intact;   Normal strength   Normal sensation  Normal gait  Skin: Skin is warm and dry.        Emergency Department Course     Results for orders placed or performed during the hospital encounter of 02/09/23 (from the past 24 hour(s))   CBC + differential    Narrative    The following orders were created for panel order CBC + differential.  Procedure                               Abnormality         Status                     ---------                               -----------         ------                     CBC with platelets and d...[[id number]]  Abnormal            Final result                 Please view results for these tests on the individual orders.   Basic metabolic panel (BMP)   Result Value Ref Range    Sodium 139 136 - 145 mmol/L    Potassium 4.0 3.4 - 5.3 mmol/L    Chloride 107 98 - 107 mmol/L    Carbon Dioxide (CO2) 23 22 - 29 mmol/L    Anion Gap 9 7 - 15 mmol/L    Urea Nitrogen 22.0 8.0 - 23.0 mg/dL    Creatinine 0.91 0.51 - 0.95 mg/dL    Calcium 9.6 8.8 - 10.2 mg/dL    Glucose 110 (H) 70 - 99 mg/dL    GFR Estimate 64 >60 mL/min/1.73m2   Troponin T, High Sensitivity (now)   Result Value Ref Range    Troponin T, High Sensitivity 16 (H) <=14 ng/L   Brilliant Draw    Narrative    The following orders were created for panel order Brilliant Draw.  Procedure                               Abnormality         Status                     ---------                               -----------         ------                     Extra Blue Top Tube[[id number]]                              Final result                 Please view results for these tests on the individual orders.    CBC with platelets and differential   Result Value Ref Range    WBC Count 5.3 4.0 - 11.0 10e3/uL    RBC Count 4.11 3.80 - 5.20 10e6/uL    Hemoglobin 13.6 11.7 - 15.7 g/dL    Hematocrit 42.6 35.0 - 47.0 %     (H) 78 - 100 fL    MCH 33.1 (H) 26.5 - 33.0 pg    MCHC 31.9 31.5 - 36.5 g/dL    RDW 13.6 10.0 - 15.0 %    Platelet Count 234 150 - 450 10e3/uL    % Neutrophils 61 %    % Lymphocytes 22 %    % Monocytes 13 %    % Eosinophils 3 %    % Basophils 1 %    % Immature Granulocytes 0 %    NRBCs per 100 WBC 0 <1 /100    Absolute Neutrophils 3.3 1.6 - 8.3 10e3/uL    Absolute Lymphocytes 1.2 0.8 - 5.3 10e3/uL    Absolute Monocytes 0.7 0.0 - 1.3 10e3/uL    Absolute Eosinophils 0.2 0.0 - 0.7 10e3/uL    Absolute Basophils 0.0 0.0 - 0.2 10e3/uL    Absolute Immature Granulocytes 0.0 <=0.4 10e3/uL    Absolute NRBCs 0.0 10e3/uL   Extra Blue Top Tube   Result Value Ref Range    Hold Specimen [facility]    EKG 12-lead, tracing only   Result Value Ref Range    Systolic Blood Pressure  mmHg    Diastolic Blood Pressure  mmHg    Ventricular Rate 71 BPM    Atrial Rate 71 BPM    SD Interval 176 ms    QRS Duration 84 ms     ms    QTc 434 ms    P Axis 35 degrees    R AXIS 1 degrees    T Axis 64 degrees    Interpretation ECG       Sinus rhythm  Voltage criteria for left ventricular hypertrophy  Nonspecific T wave abnormality  Abnormal ECG  No previous ECGs available     Troponin T, High Sensitivity (now)   Result Value Ref Range    Troponin T, High Sensitivity 13 <=14 ng/L   CT Head w/o Contrast    Narrative    EXAM: CT HEAD W/O CONTRAST, CTA HEAD NECK W CONTRAST  LOCATION: [facility]  DATE/TIME: 2/9/2023 7:07 PM    INDICATION: Dizziness, vision change  COMPARISON: None.  CONTRAST: 75 mL Isovue 370  TECHNIQUE: Head and neck CT angiogram with IV contrast. Noncontrast head CT followed by axial helical CT images of the head and neck vessels obtained during the arterial phase of intravenous contrast administration.  Axial 2D reconstructed images and   multiplanar 3D MIP reconstructed images of the head and neck vessels were performed by the technologist. Dose reduction techniques were used. All stenosis measurements made according to NASCET criteria unless otherwise specified.    FINDINGS:   NONCONTRAST HEAD CT:   INTRACRANIAL CONTENTS: No intracranial hemorrhage, extraaxial collection, or mass effect.  No CT evidence of acute infarct. Mild presumed chronic small vessel ischemic changes. Mild generalized volume loss. No hydrocephalus.     VISUALIZED ORBITS/SINUSES/MASTOIDS: Prior bilateral cataract surgery. Visualized portions of the orbits are otherwise unremarkable. No paranasal sinus mucosal disease. No middle ear or mastoid effusion.    BONES/SOFT TISSUES: No acute abnormality.    HEAD CTA:  ANTERIOR CIRCULATION:  2 mm left MCA aneurysm versus infundibulum just distal to the anterior temporal artery origin (series 5, image 333). No stenosis/occlusion or high flow vascular malformation. Fetal origin of the left posterior cerebral artery from   the anterior circulation.    POSTERIOR CIRCULATION: No stenosis/occlusion, aneurysm, or high flow vascular malformation. Dominant left vertebral artery supplies the basilar artery with a small right vertebral artery supplying the right posterior inferior cerebellar artery (PICA).     DURAL VENOUS SINUSES: Expected enhancement of the major dural venous sinuses.    NECK CTA:  RIGHT CAROTID: No measurable stenosis or dissection.    LEFT CAROTID: No measurable stenosis or dissection.    VERTEBRAL ARTERIES: No focal stenosis or dissection. Dominant left and smaller right vertebral arteries.    AORTIC ARCH: Classic aortic arch anatomy with no significant stenosis at the origin of the great vessels.    NONVASCULAR STRUCTURES:  Multilevel spondylosis with mild to moderate canal stenosis at C5-C6.      Impression    IMPRESSION:   HEAD CT:  1.  No CT evidence for acute intracranial process.  2.   Brain atrophy and presumed chronic microvascular ischemic changes as above.    HEAD CTA:   1.  No large vessel occlusion or hemodynamically significant stenosis.  2.  2 mm left MCA aneurysm versus infundibulum just distal to the anterior temporal artery origin.    NECK CTA:  1.  No large vessel occlusion or hemodynamically significant stenosis.   CTA Head Neck with Contrast    Narrative    EXAM: CT HEAD W/O CONTRAST, CTA HEAD NECK W CONTRAST  LOCATION: [facility]  DATE/TIME: 2/9/2023 7:07 PM    INDICATION: Dizziness, vision change  COMPARISON: None.  CONTRAST: 75 mL Isovue 370  TECHNIQUE: Head and neck CT angiogram with IV contrast. Noncontrast head CT followed by axial helical CT images of the head and neck vessels obtained during the arterial phase of intravenous contrast administration. Axial 2D reconstructed images and   multiplanar 3D MIP reconstructed images of the head and neck vessels were performed by the technologist. Dose reduction techniques were used. All stenosis measurements made according to NASCET criteria unless otherwise specified.    FINDINGS:   NONCONTRAST HEAD CT:   INTRACRANIAL CONTENTS: No intracranial hemorrhage, extraaxial collection, or mass effect.  No CT evidence of acute infarct. Mild presumed chronic small vessel ischemic changes. Mild generalized volume loss. No hydrocephalus.     VISUALIZED ORBITS/SINUSES/MASTOIDS: Prior bilateral cataract surgery. Visualized portions of the orbits are otherwise unremarkable. No paranasal sinus mucosal disease. No middle ear or mastoid effusion.    BONES/SOFT TISSUES: No acute abnormality.    HEAD CTA:  ANTERIOR CIRCULATION:  2 mm left MCA aneurysm versus infundibulum just distal to the anterior temporal artery origin (series 5, image 333). No stenosis/occlusion or high flow vascular malformation. Fetal origin of the left posterior cerebral artery from   the anterior circulation.    POSTERIOR CIRCULATION: No stenosis/occlusion,  aneurysm, or high flow vascular malformation. Dominant left vertebral artery supplies the basilar artery with a small right vertebral artery supplying the right posterior inferior cerebellar artery (PICA).     DURAL VENOUS SINUSES: Expected enhancement of the major dural venous sinuses.    NECK CTA:  RIGHT CAROTID: No measurable stenosis or dissection.    LEFT CAROTID: No measurable stenosis or dissection.    VERTEBRAL ARTERIES: No focal stenosis or dissection. Dominant left and smaller right vertebral arteries.    AORTIC ARCH: Classic aortic arch anatomy with no significant stenosis at the origin of the great vessels.    NONVASCULAR STRUCTURES:  Multilevel spondylosis with mild to moderate canal stenosis at C5-C6.      Impression    IMPRESSION:   HEAD CT:  1.  No CT evidence for acute intracranial process.  2.  Brain atrophy and presumed chronic microvascular ischemic changes as above.    HEAD CTA:   1.  No large vessel occlusion or hemodynamically significant stenosis.  2.  2 mm left MCA aneurysm versus infundibulum just distal to the anterior temporal artery origin.    NECK CTA:  1.  No large vessel occlusion or hemodynamically significant stenosis.        Emergency Department Course & Assessments:    Interventions:  Medications   iopamidol (ISOVUE-370) solution 500 mL (has no administration in time range)   CT scan flush (has no administration in time range)      Independent Interpretation (X-rays, CTs, rhythm strip):  No intracranial hemorrhage on CT head    Social Determinants of Health affecting care:   None    Assessments:  1705 I examined the patient and obtained history as noted above.     Disposition:  The patient was discharged to home.     Impression & Plan      Medical Decision Making:  This a pleasant 77-year-old female who presents for evaluation of the above concerns:  1.  Intermittent right arm pain: EKG is nonischemic and delta 2-hour troponin show no rise, with the second normalizing.  Given  absence of current symptoms, no additional cardiac testing is needed.  However, given intermittent symptoms which could represent anginal discomfort, we will set the patient up for a stress test.  She will follow-up with primary care in 3 to 5 days and undergo stress echocardiogram during a similar period of time.  She will return for chest pain, shortness of breath, or other acute concerns.  2.  Dizziness episodes: There is no hematologic abnormality such as severe anemia, nor electrolyte abnormalities such as hyponatremia to explain her symptoms.  EKG is unremarkable.  Given vision changes, CT head and CTA head neck were performed to evaluate for possible recent stroke or severe carotid stenosis.  These were fortunately unremarkable except for possible intracranial aneurysm.  I discussed this with her as per below.  Plan primary care follow-up for further discussion and discussed the broad differential of possible causes of lightheaded dizziness.  She has no headache or vertiginous dizziness to suggest vertigo or central cause.  3.  Abnormal CTA head: Discussed this may represent tiny aneurysm and the need for outpatient neurology follow-up.  No red flags to suggest symptomatic aneurysm at this point.        Diagnosis:    ICD-10-CM    1. Dizziness  R42       2. Pain in joint, upper arm, right  M25.521       3. Abnormal computed tomography angiography of head  R93.0     possible aneurysm         Scribe Disclosure:  I, Edmundo Kraft, am serving as a scribe at 6:45 PM on 2/9/2023 to document services personally performed by Slava Bolaños MD based on my observations and the provider's statements to me.     2/9/2023   Slava Bolaños MD [place], John Eric, MD  02/10/23 0020

## 2023-02-15 ENCOUNTER — ALLIED HEALTH/NURSE VISIT (OUTPATIENT)
Dept: INFUSION THERAPY | Facility: CLINIC | Age: 78
End: 2023-02-15
Attending: NURSE PRACTITIONER
Payer: COMMERCIAL

## 2023-02-15 VITALS
OXYGEN SATURATION: 99 % | SYSTOLIC BLOOD PRESSURE: 124 MMHG | DIASTOLIC BLOOD PRESSURE: 78 MMHG | TEMPERATURE: 97.4 F | HEART RATE: 61 BPM | RESPIRATION RATE: 18 BRPM

## 2023-02-15 DIAGNOSIS — M81.0 OSTEOPOROSIS: Primary | ICD-10-CM

## 2023-02-15 PROCEDURE — 96372 THER/PROPH/DIAG INJ SC/IM: CPT | Performed by: INTERNAL MEDICINE

## 2023-02-15 PROCEDURE — 250N000011 HC RX IP 250 OP 636: Performed by: INTERNAL MEDICINE

## 2023-02-15 RX ORDER — EPINEPHRINE 1 MG/ML
0.3 INJECTION, SOLUTION INTRAMUSCULAR; SUBCUTANEOUS EVERY 5 MIN PRN
Status: CANCELLED | OUTPATIENT
Start: 2023-03-15

## 2023-02-15 RX ORDER — ALBUTEROL SULFATE 0.83 MG/ML
2.5 SOLUTION RESPIRATORY (INHALATION)
Status: CANCELLED | OUTPATIENT
Start: 2023-03-15

## 2023-02-15 RX ORDER — DIPHENHYDRAMINE HYDROCHLORIDE 50 MG/ML
50 INJECTION INTRAMUSCULAR; INTRAVENOUS
Status: CANCELLED
Start: 2023-03-15

## 2023-02-15 RX ORDER — METHYLPREDNISOLONE SODIUM SUCCINATE 125 MG/2ML
125 INJECTION, POWDER, LYOPHILIZED, FOR SOLUTION INTRAMUSCULAR; INTRAVENOUS
Status: CANCELLED
Start: 2023-03-15

## 2023-02-15 RX ORDER — ALBUTEROL SULFATE 90 UG/1
1-2 AEROSOL, METERED RESPIRATORY (INHALATION)
Status: CANCELLED
Start: 2023-03-15

## 2023-02-15 RX ORDER — MEPERIDINE HYDROCHLORIDE 25 MG/ML
25 INJECTION INTRAMUSCULAR; INTRAVENOUS; SUBCUTANEOUS EVERY 30 MIN PRN
Status: CANCELLED | OUTPATIENT
Start: 2023-03-15

## 2023-02-15 RX ADMIN — ROMOSOZUMAB-AQQG 210 MG: 105 INJECTION, SOLUTION SUBCUTANEOUS at 13:23

## 2023-02-15 ASSESSMENT — PAIN SCALES - GENERAL: PAINLEVEL: NO PAIN (0)

## 2023-02-15 NOTE — PROGRESS NOTES
Infusion Nursing Note:  Immanuel Thomas presents today for evenity.    Patient seen by provider today: No   present during visit today: Not Applicable.    Note: Patient states dizziness and SOB on exertion that was assessed in the ED on 2/9/23 is improving. No new concerns today. Denies stroke or MI in the past year. Denies any new or unusual thigh, hip or groin pain.    Intravenous Access:  No Intravenous access/labs at this visit.    Treatment Conditions:  Lab Results   Component Value Date     02/09/2023    POTASSIUM 4.0 02/09/2023    CR 0.91 02/09/2023    SHONDA 9.6 02/09/2023     Results reviewed, labs MET treatment parameters, ok to proceed with treatment.    Post Infusion Assessment:  Patient tolerated injections without incident.  Site patent and intact, free from redness, edema or discomfort.     Discharge Plan:   Patient discharged in stable condition accompanied by: self.  Departure Mode: Ambulatory.      Sasha Ramirez RN

## 2023-03-15 ENCOUNTER — ALLIED HEALTH/NURSE VISIT (OUTPATIENT)
Dept: INFUSION THERAPY | Facility: CLINIC | Age: 78
End: 2023-03-15
Attending: INTERNAL MEDICINE
Payer: COMMERCIAL

## 2023-03-15 VITALS
OXYGEN SATURATION: 98 % | DIASTOLIC BLOOD PRESSURE: 83 MMHG | RESPIRATION RATE: 20 BRPM | SYSTOLIC BLOOD PRESSURE: 135 MMHG | TEMPERATURE: 97.4 F | HEART RATE: 68 BPM

## 2023-03-15 DIAGNOSIS — M81.0 OSTEOPOROSIS: Primary | ICD-10-CM

## 2023-03-15 PROCEDURE — 250N000011 HC RX IP 250 OP 636: Performed by: INTERNAL MEDICINE

## 2023-03-15 PROCEDURE — 96372 THER/PROPH/DIAG INJ SC/IM: CPT | Performed by: INTERNAL MEDICINE

## 2023-03-15 RX ORDER — ALBUTEROL SULFATE 0.83 MG/ML
2.5 SOLUTION RESPIRATORY (INHALATION)
Status: CANCELLED | OUTPATIENT
Start: 2023-04-12 | Stop reason: HOSPADM

## 2023-03-15 RX ORDER — METHYLPREDNISOLONE SODIUM SUCCINATE 125 MG/2ML
125 INJECTION, POWDER, LYOPHILIZED, FOR SOLUTION INTRAMUSCULAR; INTRAVENOUS
Status: CANCELLED
Start: 2023-04-12 | Stop reason: HOSPADM

## 2023-03-15 RX ORDER — MEPERIDINE HYDROCHLORIDE 25 MG/ML
25 INJECTION INTRAMUSCULAR; INTRAVENOUS; SUBCUTANEOUS EVERY 30 MIN PRN
Status: CANCELLED | OUTPATIENT
Start: 2023-04-12 | Stop reason: HOSPADM

## 2023-03-15 RX ORDER — EPINEPHRINE 1 MG/ML
0.3 INJECTION, SOLUTION INTRAMUSCULAR; SUBCUTANEOUS EVERY 5 MIN PRN
Status: CANCELLED | OUTPATIENT
Start: 2023-04-12 | Stop reason: HOSPADM

## 2023-03-15 RX ORDER — ALBUTEROL SULFATE 90 UG/1
1-2 AEROSOL, METERED RESPIRATORY (INHALATION)
Status: CANCELLED
Start: 2023-04-12 | Stop reason: HOSPADM

## 2023-03-15 RX ORDER — DIPHENHYDRAMINE HYDROCHLORIDE 50 MG/ML
50 INJECTION INTRAMUSCULAR; INTRAVENOUS
Status: CANCELLED
Start: 2023-04-12 | Stop reason: HOSPADM

## 2023-03-15 RX ADMIN — ROMOSOZUMAB-AQQG 210 MG: 105 INJECTION, SOLUTION SUBCUTANEOUS at 13:59

## 2023-03-15 NOTE — PROGRESS NOTES
Infusion Nursing Note:  Immanuel Thomas presents today for Evenity.    Patient seen by provider today: No   present during visit today: Not Applicable.    Note: N/A.    Intravenous Access:  No Intravenous access/labs at this visit.    Treatment Conditions:  Not Applicable.    Post Infusion Assessment:  Patient tolerated injection without incident.  No evidence of extravasations.  Access discontinued per protocol.     Discharge Plan:   Patient declined prescription refills.  Discharge instructions reviewed with: Patient.  Patient and/or family verbalized understanding of discharge instructions and all questions answered.  AVS to patient via [employer].  Patient will return when scheduled for next appointment.   Patient discharged in stable condition accompanied by: self.  Departure Mode: Ambulatory.      Raymond Buckner RN

## 2023-05-16 RX ORDER — MEPERIDINE HYDROCHLORIDE 25 MG/ML
25 INJECTION INTRAMUSCULAR; INTRAVENOUS; SUBCUTANEOUS EVERY 30 MIN PRN
Status: CANCELLED | OUTPATIENT
Start: 2023-05-16

## 2023-05-16 RX ORDER — METHYLPREDNISOLONE SODIUM SUCCINATE 125 MG/2ML
125 INJECTION, POWDER, LYOPHILIZED, FOR SOLUTION INTRAMUSCULAR; INTRAVENOUS
Status: CANCELLED
Start: 2023-05-16

## 2023-05-16 RX ORDER — EPINEPHRINE 1 MG/ML
0.3 INJECTION, SOLUTION INTRAMUSCULAR; SUBCUTANEOUS EVERY 5 MIN PRN
Status: CANCELLED | OUTPATIENT
Start: 2023-05-16

## 2023-05-16 RX ORDER — DIPHENHYDRAMINE HYDROCHLORIDE 50 MG/ML
50 INJECTION INTRAMUSCULAR; INTRAVENOUS
Status: CANCELLED
Start: 2023-05-16

## 2023-05-16 RX ORDER — ALBUTEROL SULFATE 0.83 MG/ML
2.5 SOLUTION RESPIRATORY (INHALATION)
Status: CANCELLED | OUTPATIENT
Start: 2023-05-16

## 2023-05-16 RX ORDER — ALBUTEROL SULFATE 90 UG/1
1-2 AEROSOL, METERED RESPIRATORY (INHALATION)
Status: CANCELLED
Start: 2023-05-16

## 2023-05-17 ENCOUNTER — ALLIED HEALTH/NURSE VISIT (OUTPATIENT)
Dept: INFUSION THERAPY | Facility: CLINIC | Age: 78
End: 2023-05-17
Attending: INTERNAL MEDICINE
Payer: COMMERCIAL

## 2023-05-17 VITALS
TEMPERATURE: 98 F | RESPIRATION RATE: 20 BRPM | OXYGEN SATURATION: 99 % | SYSTOLIC BLOOD PRESSURE: 128 MMHG | HEART RATE: 76 BPM | DIASTOLIC BLOOD PRESSURE: 83 MMHG

## 2023-05-17 DIAGNOSIS — M81.0 OSTEOPOROSIS: Primary | ICD-10-CM

## 2023-05-17 PROCEDURE — 96372 THER/PROPH/DIAG INJ SC/IM: CPT | Performed by: INTERNAL MEDICINE

## 2023-05-17 PROCEDURE — 250N000011 HC RX IP 250 OP 636: Performed by: INTERNAL MEDICINE

## 2023-05-17 RX ORDER — EPINEPHRINE 1 MG/ML
0.3 INJECTION, SOLUTION INTRAMUSCULAR; SUBCUTANEOUS EVERY 5 MIN PRN
Status: CANCELLED | OUTPATIENT
Start: 2023-06-07

## 2023-05-17 RX ORDER — ALBUTEROL SULFATE 0.83 MG/ML
2.5 SOLUTION RESPIRATORY (INHALATION)
Status: CANCELLED | OUTPATIENT
Start: 2023-06-07

## 2023-05-17 RX ORDER — MEPERIDINE HYDROCHLORIDE 25 MG/ML
25 INJECTION INTRAMUSCULAR; INTRAVENOUS; SUBCUTANEOUS EVERY 30 MIN PRN
Status: CANCELLED | OUTPATIENT
Start: 2023-06-07

## 2023-05-17 RX ORDER — ALBUTEROL SULFATE 90 UG/1
1-2 AEROSOL, METERED RESPIRATORY (INHALATION)
Status: CANCELLED
Start: 2023-06-07

## 2023-05-17 RX ORDER — METHYLPREDNISOLONE SODIUM SUCCINATE 125 MG/2ML
125 INJECTION, POWDER, LYOPHILIZED, FOR SOLUTION INTRAMUSCULAR; INTRAVENOUS
Status: CANCELLED
Start: 2023-06-07

## 2023-05-17 RX ORDER — DIPHENHYDRAMINE HYDROCHLORIDE 50 MG/ML
50 INJECTION INTRAMUSCULAR; INTRAVENOUS
Status: CANCELLED
Start: 2023-06-07

## 2023-05-17 RX ADMIN — ROMOSOZUMAB-AQQG 210 MG: 105 INJECTION, SOLUTION SUBCUTANEOUS at 14:22

## 2023-05-17 ASSESSMENT — PAIN SCALES - GENERAL: PAINLEVEL: NO PAIN (0)

## 2023-05-17 NOTE — PROGRESS NOTES
Infusion Nursing Note:  Immanuel Thomas presents today for evenity.    Patient seen by provider today: No   present during visit today: Not Applicable.    Note: N/A.      Intravenous Access:  No Intravenous access/labs at this visit.    Treatment Conditions:  Not Applicable.      Post Infusion Assessment:  Patient tolerated injection without incident.  Site patent and intact, free from redness, edema or discomfort.       Discharge Plan:   AVS to patient via [employer].  Patient will return in one month as prev jackie for next appointment.   Patient discharged in stable condition accompanied by: self.  Departure Mode: Ambulatory.      Benjamin Tinoco RN

## 2023-06-20 ENCOUNTER — ALLIED HEALTH/NURSE VISIT (OUTPATIENT)
Dept: INFUSION THERAPY | Facility: CLINIC | Age: 78
End: 2023-06-20
Attending: INTERNAL MEDICINE
Payer: COMMERCIAL

## 2023-06-20 VITALS
SYSTOLIC BLOOD PRESSURE: 114 MMHG | OXYGEN SATURATION: 97 % | HEART RATE: 81 BPM | RESPIRATION RATE: 18 BRPM | DIASTOLIC BLOOD PRESSURE: 69 MMHG | TEMPERATURE: 97.4 F

## 2023-06-20 DIAGNOSIS — M81.0 OSTEOPOROSIS: Primary | ICD-10-CM

## 2023-06-20 PROCEDURE — 250N000011 HC RX IP 250 OP 636: Performed by: INTERNAL MEDICINE

## 2023-06-20 PROCEDURE — 96372 THER/PROPH/DIAG INJ SC/IM: CPT | Performed by: INTERNAL MEDICINE

## 2023-06-20 RX ORDER — DIPHENHYDRAMINE HYDROCHLORIDE 50 MG/ML
50 INJECTION INTRAMUSCULAR; INTRAVENOUS
Status: CANCELLED
Start: 2023-07-12

## 2023-06-20 RX ORDER — EPINEPHRINE 1 MG/ML
0.3 INJECTION, SOLUTION INTRAMUSCULAR; SUBCUTANEOUS EVERY 5 MIN PRN
Status: CANCELLED | OUTPATIENT
Start: 2023-07-12

## 2023-06-20 RX ORDER — ALBUTEROL SULFATE 90 UG/1
1-2 AEROSOL, METERED RESPIRATORY (INHALATION)
Status: CANCELLED
Start: 2023-07-12

## 2023-06-20 RX ORDER — MEPERIDINE HYDROCHLORIDE 25 MG/ML
25 INJECTION INTRAMUSCULAR; INTRAVENOUS; SUBCUTANEOUS EVERY 30 MIN PRN
Status: CANCELLED | OUTPATIENT
Start: 2023-07-12

## 2023-06-20 RX ORDER — ALBUTEROL SULFATE 0.83 MG/ML
2.5 SOLUTION RESPIRATORY (INHALATION)
Status: CANCELLED | OUTPATIENT
Start: 2023-07-12

## 2023-06-20 RX ORDER — METHYLPREDNISOLONE SODIUM SUCCINATE 125 MG/2ML
125 INJECTION, POWDER, LYOPHILIZED, FOR SOLUTION INTRAMUSCULAR; INTRAVENOUS
Status: CANCELLED
Start: 2023-07-12

## 2023-06-20 RX ADMIN — ROMOSOZUMAB-AQQG 210 MG: 105 INJECTION, SOLUTION SUBCUTANEOUS at 14:42

## 2023-06-20 ASSESSMENT — PAIN SCALES - GENERAL: PAINLEVEL: NO PAIN (0)

## 2023-06-20 NOTE — PROGRESS NOTES
Infusion Nursing Note:  Yuchacho Thomas presents today for evenity.    Patient seen by provider today: No   present during visit today: Not Applicable.    Note: Patient recently diagnosed with pneumonia, completed her antibiotics course on 6/17. Mild cough and dyspnea on exertion continue, however much improved per patient. Denies fevers. No other new concerns to report today. Denies stroke or MI in the past year. Denies any new or unusual thigh, hip or groin pain.      Intravenous Access:  No Intravenous access/labs at this visit.    Treatment Conditions:  Lab Results   Component Value Date     02/09/2023    POTASSIUM 4.0 02/09/2023    CR 0.91 02/09/2023    SHONDA 9.6 02/09/2023     Results reviewed, labs MET treatment parameters, ok to proceed with treatment.      Post Infusion Assessment:  Patient tolerated injections without incident.  Site patent and intact, free from redness, edema or discomfort.       Discharge Plan:   Patient discharged in stable condition accompanied by: self.  Departure Mode: Ambulatory.      Sasha Ramirez RN

## 2023-07-19 ENCOUNTER — ALLIED HEALTH/NURSE VISIT (OUTPATIENT)
Dept: INFUSION THERAPY | Facility: CLINIC | Age: 78
End: 2023-07-19
Attending: INTERNAL MEDICINE
Payer: COMMERCIAL

## 2023-07-19 VITALS
RESPIRATION RATE: 18 BRPM | HEART RATE: 76 BPM | TEMPERATURE: 97.7 F | SYSTOLIC BLOOD PRESSURE: 123 MMHG | DIASTOLIC BLOOD PRESSURE: 77 MMHG

## 2023-07-19 DIAGNOSIS — M81.0 OSTEOPOROSIS: Primary | ICD-10-CM

## 2023-07-19 LAB
CALCIUM SERPL-MCNC: 9.7 MG/DL (ref 8.8–10.2)
CREAT SERPL-MCNC: 0.96 MG/DL (ref 0.51–0.95)
GFR SERPL CREATININE-BSD FRML MDRD: 60 ML/MIN/1.73M2

## 2023-07-19 PROCEDURE — 82565 ASSAY OF CREATININE: CPT | Performed by: INTERNAL MEDICINE

## 2023-07-19 PROCEDURE — 82310 ASSAY OF CALCIUM: CPT | Performed by: INTERNAL MEDICINE

## 2023-07-19 PROCEDURE — 250N000011 HC RX IP 250 OP 636: Mod: JZ | Performed by: INTERNAL MEDICINE

## 2023-07-19 PROCEDURE — 36415 COLL VENOUS BLD VENIPUNCTURE: CPT | Performed by: INTERNAL MEDICINE

## 2023-07-19 PROCEDURE — 96372 THER/PROPH/DIAG INJ SC/IM: CPT | Performed by: INTERNAL MEDICINE

## 2023-07-19 RX ORDER — ALBUTEROL SULFATE 90 UG/1
1-2 AEROSOL, METERED RESPIRATORY (INHALATION)
Status: CANCELLED
Start: 2023-08-15

## 2023-07-19 RX ORDER — METHYLPREDNISOLONE SODIUM SUCCINATE 125 MG/2ML
125 INJECTION, POWDER, LYOPHILIZED, FOR SOLUTION INTRAMUSCULAR; INTRAVENOUS
Status: CANCELLED
Start: 2023-08-15

## 2023-07-19 RX ORDER — DIPHENHYDRAMINE HYDROCHLORIDE 50 MG/ML
50 INJECTION INTRAMUSCULAR; INTRAVENOUS
Status: CANCELLED
Start: 2023-08-15

## 2023-07-19 RX ORDER — EPINEPHRINE 1 MG/ML
0.3 INJECTION, SOLUTION INTRAMUSCULAR; SUBCUTANEOUS EVERY 5 MIN PRN
Status: CANCELLED | OUTPATIENT
Start: 2023-08-15

## 2023-07-19 RX ORDER — MEPERIDINE HYDROCHLORIDE 25 MG/ML
25 INJECTION INTRAMUSCULAR; INTRAVENOUS; SUBCUTANEOUS EVERY 30 MIN PRN
Status: CANCELLED | OUTPATIENT
Start: 2023-08-15

## 2023-07-19 RX ORDER — ALBUTEROL SULFATE 0.83 MG/ML
2.5 SOLUTION RESPIRATORY (INHALATION)
Status: CANCELLED | OUTPATIENT
Start: 2023-08-15

## 2023-07-19 RX ADMIN — ROMOSOZUMAB-AQQG 210 MG: 105 INJECTION, SOLUTION SUBCUTANEOUS at 14:50

## 2023-07-19 NOTE — PROGRESS NOTES
Infusion Nursing Note:  Immanuel Thomas presents today for evenity/labs.    Patient seen by provider today: No   present during visit today: Not Applicable.    Note: N/A.      Intravenous Access:  Lab draw site right AC, Needle type butterfly, Gauge 23.  Labs drawn without difficulty.    Treatment Conditions:  Lab Results   Component Value Date     02/09/2023    POTASSIUM 4.0 02/09/2023    CR 0.91 02/09/2023    SHONDA 9.6 02/09/2023     Results reviewed, labs MET treatment parameters, ok to proceed with treatment.      Post Infusion Assessment:  Patient tolerated injection and blood draw without incident.       Discharge Plan:   Discharge instructions reviewed with: Patient.  Patient and/or family verbalized understanding of discharge instructions and all questions answered.  Patient discharged in stable condition accompanied by: self.  Departure Mode: Ambulatory.      Sandi Vasquez RN

## 2023-09-27 ENCOUNTER — ALLIED HEALTH/NURSE VISIT (OUTPATIENT)
Dept: INFUSION THERAPY | Facility: CLINIC | Age: 78
End: 2023-09-27
Attending: INTERNAL MEDICINE
Payer: COMMERCIAL

## 2023-09-27 VITALS
TEMPERATURE: 97.6 F | DIASTOLIC BLOOD PRESSURE: 70 MMHG | SYSTOLIC BLOOD PRESSURE: 118 MMHG | RESPIRATION RATE: 16 BRPM | OXYGEN SATURATION: 100 % | HEART RATE: 75 BPM

## 2023-09-27 DIAGNOSIS — M81.0 OSTEOPOROSIS: Primary | ICD-10-CM

## 2023-09-27 PROCEDURE — 96372 THER/PROPH/DIAG INJ SC/IM: CPT | Performed by: INTERNAL MEDICINE

## 2023-09-27 PROCEDURE — 250N000011 HC RX IP 250 OP 636: Mod: JZ | Performed by: INTERNAL MEDICINE

## 2023-09-27 RX ORDER — ALBUTEROL SULFATE 0.83 MG/ML
2.5 SOLUTION RESPIRATORY (INHALATION)
Status: CANCELLED | OUTPATIENT
Start: 2023-10-11

## 2023-09-27 RX ORDER — MEPERIDINE HYDROCHLORIDE 25 MG/ML
25 INJECTION INTRAMUSCULAR; INTRAVENOUS; SUBCUTANEOUS EVERY 30 MIN PRN
Status: CANCELLED | OUTPATIENT
Start: 2023-10-11

## 2023-09-27 RX ORDER — EPINEPHRINE 1 MG/ML
0.3 INJECTION, SOLUTION INTRAMUSCULAR; SUBCUTANEOUS EVERY 5 MIN PRN
Status: CANCELLED | OUTPATIENT
Start: 2023-10-11

## 2023-09-27 RX ORDER — ALBUTEROL SULFATE 90 UG/1
1-2 AEROSOL, METERED RESPIRATORY (INHALATION)
Status: CANCELLED
Start: 2023-10-11

## 2023-09-27 RX ORDER — METHYLPREDNISOLONE SODIUM SUCCINATE 125 MG/2ML
125 INJECTION, POWDER, LYOPHILIZED, FOR SOLUTION INTRAMUSCULAR; INTRAVENOUS
Status: CANCELLED
Start: 2023-10-11

## 2023-09-27 RX ORDER — DIPHENHYDRAMINE HYDROCHLORIDE 50 MG/ML
50 INJECTION INTRAMUSCULAR; INTRAVENOUS
Status: CANCELLED
Start: 2023-10-11

## 2023-09-27 RX ADMIN — ROMOSOZUMAB-AQQG 210 MG: 105 INJECTION, SOLUTION SUBCUTANEOUS at 14:44

## 2023-09-27 ASSESSMENT — PAIN SCALES - GENERAL: PAINLEVEL: NO PAIN (0)

## 2023-09-27 NOTE — PROGRESS NOTES
Infusion Nursing Note:  Immanuel Thomas presents today for Evenity injections.    Patient seen by provider today: No   present during visit today: Not Applicable.    Note: N/A.      Intravenous Access:  No Intravenous access/labs at this visit.    Treatment Conditions:  Lab Results   Component Value Date     02/09/2023    POTASSIUM 4.0 02/09/2023    CR 0.96 (H) 07/19/2023    SHONDA 9.7 07/19/2023       Results reviewed, labs MET treatment parameters, ok to proceed with treatment.      Post Infusion Assessment:  Patient tolerated injection without incident.       Discharge Plan:   Discharge instructions reviewed with: Patient.  Patient and/or family verbalized understanding of discharge instructions and all questions answered.  Patient discharged in stable condition accompanied by: self.  Departure Mode: Ambulatory.      Nirali Villegas RN

## 2023-10-27 ENCOUNTER — ALLIED HEALTH/NURSE VISIT (OUTPATIENT)
Dept: INFUSION THERAPY | Facility: CLINIC | Age: 78
End: 2023-10-27
Attending: INTERNAL MEDICINE
Payer: COMMERCIAL

## 2023-10-27 VITALS
HEART RATE: 69 BPM | TEMPERATURE: 97.4 F | RESPIRATION RATE: 16 BRPM | SYSTOLIC BLOOD PRESSURE: 133 MMHG | DIASTOLIC BLOOD PRESSURE: 80 MMHG

## 2023-10-27 DIAGNOSIS — M81.0 OSTEOPOROSIS: Primary | ICD-10-CM

## 2023-10-27 PROCEDURE — 250N000011 HC RX IP 250 OP 636: Mod: JZ | Performed by: INTERNAL MEDICINE

## 2023-10-27 PROCEDURE — 96372 THER/PROPH/DIAG INJ SC/IM: CPT | Performed by: INTERNAL MEDICINE

## 2023-10-27 RX ORDER — METHYLPREDNISOLONE SODIUM SUCCINATE 125 MG/2ML
125 INJECTION, POWDER, LYOPHILIZED, FOR SOLUTION INTRAMUSCULAR; INTRAVENOUS
Status: CANCELLED
Start: 2023-11-22

## 2023-10-27 RX ORDER — ALBUTEROL SULFATE 90 UG/1
1-2 AEROSOL, METERED RESPIRATORY (INHALATION)
Status: CANCELLED
Start: 2023-11-22

## 2023-10-27 RX ORDER — DIPHENHYDRAMINE HYDROCHLORIDE 50 MG/ML
50 INJECTION INTRAMUSCULAR; INTRAVENOUS
Status: CANCELLED
Start: 2023-11-22

## 2023-10-27 RX ORDER — ALBUTEROL SULFATE 0.83 MG/ML
2.5 SOLUTION RESPIRATORY (INHALATION)
Status: CANCELLED | OUTPATIENT
Start: 2023-11-22

## 2023-10-27 RX ORDER — EPINEPHRINE 1 MG/ML
0.3 INJECTION, SOLUTION INTRAMUSCULAR; SUBCUTANEOUS EVERY 5 MIN PRN
Status: CANCELLED | OUTPATIENT
Start: 2023-11-22

## 2023-10-27 RX ORDER — MEPERIDINE HYDROCHLORIDE 25 MG/ML
25 INJECTION INTRAMUSCULAR; INTRAVENOUS; SUBCUTANEOUS EVERY 30 MIN PRN
Status: CANCELLED | OUTPATIENT
Start: 2023-11-22

## 2023-10-27 RX ADMIN — ROMOSOZUMAB-AQQG 210 MG: 105 INJECTION, SOLUTION SUBCUTANEOUS at 14:55

## 2023-10-27 ASSESSMENT — PAIN SCALES - GENERAL: PAINLEVEL: NO PAIN (0)

## 2023-10-27 NOTE — PROGRESS NOTES
Infusion Nursing Note:  Immanuel Thomas presents today for evenity.    Patient seen by provider today: No   present during visit today: Not Applicable.    Note: N/A.      Intravenous Access:  No Intravenous access/labs at this visit.    Treatment Conditions:  Lab Results   Component Value Date     02/09/2023    POTASSIUM 4.0 02/09/2023    CR 0.96 (H) 07/19/2023    SHONDA 9.7 07/19/2023       Results reviewed, labs MET treatment parameters, ok to proceed with treatment.      Post Infusion Assessment:  Patient tolerated injection without incident.       Discharge Plan:   Patient and/or family verbalized understanding of discharge instructions and all questions answered.  AVS to patient via [employer].    Patient discharged in stable condition accompanied by: self.  Departure Mode: Ambulatory.      Nenita Powers RN    
No

## 2023-11-10 ENCOUNTER — TRANSFERRED RECORDS (OUTPATIENT)
Dept: HEALTH INFORMATION MANAGEMENT | Facility: CLINIC | Age: 78
End: 2023-11-10
Payer: COMMERCIAL

## 2023-11-10 LAB
CREATININE (EXTERNAL): 1.17 MG/DL (ref 0.52–1.04)
GFR ESTIMATED (EXTERNAL): 47 ML/MIN/1.7
GLUCOSE (EXTERNAL): 93 MG/DL (ref 70–99)
POTASSIUM (EXTERNAL): 4.6 MMOL/L (ref 3.5–5.1)

## 2023-11-14 ENCOUNTER — MEDICAL CORRESPONDENCE (OUTPATIENT)
Dept: HEALTH INFORMATION MANAGEMENT | Facility: CLINIC | Age: 78
End: 2023-11-14
Payer: COMMERCIAL

## 2023-11-14 DIAGNOSIS — M81.0 OSTEOPOROSIS: Primary | ICD-10-CM

## 2023-11-14 RX ORDER — MEPERIDINE HYDROCHLORIDE 25 MG/ML
25 INJECTION INTRAMUSCULAR; INTRAVENOUS; SUBCUTANEOUS EVERY 30 MIN PRN
OUTPATIENT
Start: 2023-12-01

## 2023-11-14 RX ORDER — ALBUTEROL SULFATE 90 UG/1
1-2 AEROSOL, METERED RESPIRATORY (INHALATION)
Start: 2023-12-01

## 2023-11-14 RX ORDER — ALBUTEROL SULFATE 0.83 MG/ML
2.5 SOLUTION RESPIRATORY (INHALATION)
OUTPATIENT
Start: 2023-12-01

## 2023-11-14 RX ORDER — HEPARIN SODIUM (PORCINE) LOCK FLUSH IV SOLN 100 UNIT/ML 100 UNIT/ML
5 SOLUTION INTRAVENOUS
OUTPATIENT
Start: 2023-12-01

## 2023-11-14 RX ORDER — DIPHENHYDRAMINE HYDROCHLORIDE 50 MG/ML
50 INJECTION INTRAMUSCULAR; INTRAVENOUS
Start: 2023-12-01

## 2023-11-14 RX ORDER — METHYLPREDNISOLONE SODIUM SUCCINATE 125 MG/2ML
125 INJECTION, POWDER, LYOPHILIZED, FOR SOLUTION INTRAMUSCULAR; INTRAVENOUS
Start: 2023-12-01

## 2023-11-14 RX ORDER — EPINEPHRINE 1 MG/ML
0.3 INJECTION, SOLUTION INTRAMUSCULAR; SUBCUTANEOUS EVERY 5 MIN PRN
OUTPATIENT
Start: 2023-12-01

## 2023-11-14 RX ORDER — HEPARIN SODIUM,PORCINE 10 UNIT/ML
5-20 VIAL (ML) INTRAVENOUS DAILY PRN
OUTPATIENT
Start: 2023-12-01

## 2023-11-14 RX ORDER — ZOLEDRONIC ACID 5 MG/100ML
5 INJECTION, SOLUTION INTRAVENOUS ONCE
Start: 2023-12-01

## 2023-11-30 ENCOUNTER — ALLIED HEALTH/NURSE VISIT (OUTPATIENT)
Dept: INFUSION THERAPY | Facility: CLINIC | Age: 78
End: 2023-11-30
Attending: INTERNAL MEDICINE
Payer: COMMERCIAL

## 2023-11-30 VITALS
DIASTOLIC BLOOD PRESSURE: 76 MMHG | SYSTOLIC BLOOD PRESSURE: 103 MMHG | RESPIRATION RATE: 16 BRPM | OXYGEN SATURATION: 99 % | HEART RATE: 88 BPM

## 2023-11-30 DIAGNOSIS — M81.0 OSTEOPOROSIS: Primary | ICD-10-CM

## 2023-11-30 PROCEDURE — 250N000011 HC RX IP 250 OP 636: Mod: JZ | Performed by: INTERNAL MEDICINE

## 2023-11-30 PROCEDURE — 96372 THER/PROPH/DIAG INJ SC/IM: CPT | Performed by: INTERNAL MEDICINE

## 2023-11-30 RX ORDER — ALBUTEROL SULFATE 90 UG/1
1-2 AEROSOL, METERED RESPIRATORY (INHALATION)
Start: 2023-12-22

## 2023-11-30 RX ORDER — ALBUTEROL SULFATE 0.83 MG/ML
2.5 SOLUTION RESPIRATORY (INHALATION)
OUTPATIENT
Start: 2023-12-22

## 2023-11-30 RX ORDER — EPINEPHRINE 1 MG/ML
0.3 INJECTION, SOLUTION INTRAMUSCULAR; SUBCUTANEOUS EVERY 5 MIN PRN
OUTPATIENT
Start: 2023-12-22

## 2023-11-30 RX ORDER — LANOLIN ALCOHOL/MO/W.PET/CERES
1 CREAM (GRAM) TOPICAL 2 TIMES DAILY WITH MEALS
COMMUNITY

## 2023-11-30 RX ORDER — METHYLPREDNISOLONE SODIUM SUCCINATE 125 MG/2ML
125 INJECTION, POWDER, LYOPHILIZED, FOR SOLUTION INTRAMUSCULAR; INTRAVENOUS
Start: 2023-12-22

## 2023-11-30 RX ORDER — DIPHENHYDRAMINE HYDROCHLORIDE 50 MG/ML
50 INJECTION INTRAMUSCULAR; INTRAVENOUS
Start: 2023-12-22

## 2023-11-30 RX ORDER — MEPERIDINE HYDROCHLORIDE 25 MG/ML
25 INJECTION INTRAMUSCULAR; INTRAVENOUS; SUBCUTANEOUS EVERY 30 MIN PRN
OUTPATIENT
Start: 2023-12-22

## 2023-11-30 RX ADMIN — ROMOSOZUMAB-AQQG 210 MG: 105 INJECTION, SOLUTION SUBCUTANEOUS at 13:55

## 2023-11-30 NOTE — PROGRESS NOTES
Infusion Nursing Note:  Immanuel Thomas presents today for Evenity.    Patient seen by provider today: No   present during visit today: Not Applicable.    Note: N/A.      Intravenous Access:  No Intravenous access/labs at this visit.    Treatment Conditions:  Not Applicable.      Post Infusion Assessment:  Patient tolerated injection without incident.  Site patent and intact, free from redness, edema or discomfort.       Discharge Plan:   Discharge instructions reviewed with: Patient.  Patient and/or family verbalized understanding of discharge instructions and all questions answered.  AVS to patient via [employer]  Patient will return 12/29/23 for next appointment.   Patient discharged in stable condition accompanied by: self.  Departure Mode: Ambulatory.      Alex Damian RN

## 2023-12-29 ENCOUNTER — INFUSION THERAPY VISIT (OUTPATIENT)
Dept: INFUSION THERAPY | Facility: CLINIC | Age: 78
End: 2023-12-29
Attending: INTERNAL MEDICINE
Payer: COMMERCIAL

## 2023-12-29 VITALS
TEMPERATURE: 97.2 F | HEART RATE: 67 BPM | OXYGEN SATURATION: 100 % | SYSTOLIC BLOOD PRESSURE: 137 MMHG | DIASTOLIC BLOOD PRESSURE: 83 MMHG | RESPIRATION RATE: 18 BRPM

## 2023-12-29 DIAGNOSIS — M81.0 OSTEOPOROSIS: Primary | ICD-10-CM

## 2023-12-29 PROCEDURE — 96372 THER/PROPH/DIAG INJ SC/IM: CPT | Performed by: INTERNAL MEDICINE

## 2023-12-29 PROCEDURE — 250N000011 HC RX IP 250 OP 636: Mod: JZ | Performed by: INTERNAL MEDICINE

## 2023-12-29 RX ORDER — ALBUTEROL SULFATE 0.83 MG/ML
2.5 SOLUTION RESPIRATORY (INHALATION)
OUTPATIENT
Start: 2024-01-15

## 2023-12-29 RX ORDER — METHYLPREDNISOLONE SODIUM SUCCINATE 125 MG/2ML
125 INJECTION, POWDER, LYOPHILIZED, FOR SOLUTION INTRAMUSCULAR; INTRAVENOUS
Start: 2024-01-15

## 2023-12-29 RX ORDER — ALBUTEROL SULFATE 90 UG/1
1-2 AEROSOL, METERED RESPIRATORY (INHALATION)
Start: 2024-01-15

## 2023-12-29 RX ORDER — MEPERIDINE HYDROCHLORIDE 25 MG/ML
25 INJECTION INTRAMUSCULAR; INTRAVENOUS; SUBCUTANEOUS EVERY 30 MIN PRN
OUTPATIENT
Start: 2024-01-15

## 2023-12-29 RX ORDER — EPINEPHRINE 1 MG/ML
0.3 INJECTION, SOLUTION INTRAMUSCULAR; SUBCUTANEOUS EVERY 5 MIN PRN
OUTPATIENT
Start: 2024-01-15

## 2023-12-29 RX ORDER — DIPHENHYDRAMINE HYDROCHLORIDE 50 MG/ML
50 INJECTION INTRAMUSCULAR; INTRAVENOUS
Start: 2024-01-15

## 2023-12-29 RX ADMIN — ROMOSOZUMAB-AQQG 210 MG: 105 INJECTION, SOLUTION SUBCUTANEOUS at 14:58

## 2023-12-29 ASSESSMENT — PAIN SCALES - GENERAL: PAINLEVEL: NO PAIN (0)

## 2023-12-29 NOTE — PROGRESS NOTES
Infusion Nursing Note:  Immanuel Thomas presents today for Evenity.    Patient seen by provider today: No   present during visit today: Not Applicable.    Note: Patient reports to feeling well today with no new concerns to report.  Today is her last Evenity injection.  Patient will start yearly Reclast starting the end of January at [facility] as her insurance will not cover Reclast here at [facility].      Intravenous Access:  No Intravenous access/labs at this visit.    Treatment Conditions:  Lab Results   Component Value Date     02/09/2023    POTASSIUM 4.0 02/09/2023    CR 0.96 (H) 07/19/2023    SHONDA 9.7 07/19/2023         Post Infusion Assessment:  Patient tolerated two Evenity injections without incident to left arm without issue.       Discharge Plan:   Patient declined prescription refills.  Discharge instructions reviewed with: Patient.  Patient and/or family verbalized understanding of discharge instructions and all questions answered.  Copy of AVS reviewed with patient and/or family.    Patient discharged in stable condition accompanied by: self.  Departure Mode: Ambulatory.      Melanie Dickinson RN